# Patient Record
Sex: FEMALE | Race: WHITE | Employment: OTHER | URBAN - METROPOLITAN AREA
[De-identification: names, ages, dates, MRNs, and addresses within clinical notes are randomized per-mention and may not be internally consistent; named-entity substitution may affect disease eponyms.]

---

## 2018-11-05 ENCOUNTER — HOSPITAL ENCOUNTER (OUTPATIENT)
Dept: PHYSICAL THERAPY | Age: 78
Discharge: HOME | End: 2018-11-05
Payer: MEDICARE

## 2018-11-05 PROCEDURE — 97140 MANUAL THERAPY 1/> REGIONS: CPT

## 2018-11-05 PROCEDURE — 97110 THERAPEUTIC EXERCISES: CPT

## 2018-11-05 PROCEDURE — 97161 PT EVAL LOW COMPLEX 20 MIN: CPT

## 2018-11-05 PROCEDURE — G8985 CARRY GOAL STATUS: HCPCS

## 2018-11-05 PROCEDURE — G8984 CARRY CURRENT STATUS: HCPCS

## 2018-11-05 NOTE — PROGRESS NOTES
PT DAILY TREATMENT NOTE  Patient Name: Dorina Guevara Date:2018 : 1940 [x]  Patient  Verified Payor: [place] / Plan: [facility]ecia [facility] / Product Type: Medicare / In time:9:32  Out time:10:28 Total Treatment Time (min): 56 Total Timed Codes (min): 46 
1:1 Treatment Time (min): 46 Visit #: 1 of 8-10 Treatment Area: Acute pain of left wrist [M25.532] SUBJECTIVE Pain Level (0-10 scale): 2 Any medication changes, allergies to medications, adverse drug reactions, diagnosis change, or new procedure performed?: [x] No    [] Yes (see summary sheet for update) Subjective functional status/changes:   [] No changes reported SEE IE for Subjective Information OBJECTIVE Modality rationale: decrease edema, decrease inflammation and decrease pain to improve the patients ability to improve holding objects Type Additional Details  
[] Estim: []Att   []Unatt        []TENS instruct []IFC  []Premod   []NMES []Other:  []w/US   []w/ice   []w/heat Position: Location:  
[]  Traction: [] Cervical       []Lumbar 
                     [] Prone          []Supine []Intermittent   []Continuous Lbs: 
[] before manual 
[] after manual  
[]  Ultrasound: []Continuous   [] Pulsed []1MHz   []3MHz Location: 
W/cm2:  
[]  Iontophoresis with dexamethasone Location: [] Take home patch  
[] In clinic [x]  Ice     []  heat 
[]  Ice massage Position: sitting Location: L wrist   
[]  Vasopneumatic Device Pressure:       [] lo [] med [] hi  
Temperature: [] lo [] med [] hi  
[x] Skin assessment post-treatment:  [x]intact []redness- no adverse reaction 
     []redness  adverse reaction:  
 
 
15 min Therapeutic Exercise:  [x] See flow sheet :  
Rationale: increase ROM and increase strength to improve the patients ability to improve gripping, ADls 15 min Manual Therapy:  Retrograde massage; PROm L wrist, DMT to wrist extensors and flexors Rationale: decrease pain, increase ROM, increase tissue extensibility and decrease edema  to ipmrove   
 
       
x min Patient Education: [x] Review HEP    [] Progressed/Changed HEP based on:  
[] positioning   [] body mechanics   [] transfers   [] heat/ice application Other Objective/Functional Measures:  
See IE Wrist AROm: R ext 75, flexion 24, ulnar dev 40, radial dev 15 L extension 15, flexion 5, ulnar dev 15, radial 15 Supination  R 70, L 45 Pronation R 65, L 55 Edema: at styloid: L 18.5, R 17 Decreased edema to 17.5cm after MT  
 
Pain Level (0-10 scale) post treatment: 0 
 
ASSESSMENT/Changes in Function: see IE. Good respones to today's Rx Patient will continue to benefit from skilled PT services to modify and progress therapeutic interventions, address functional mobility deficits, address ROM deficits, address strength deficits, analyze and address soft tissue restrictions, analyze and cue movement patterns, analyze and modify body mechanics/ergonomics, assess and modify postural abnormalities and instruct in home and community integration to attain remaining goals. [x]  See Plan of Care 
[]  See progress note/recertification 
[]  See Discharge Summary Progress towards goals / Updated goals: SEE IE FOR GOALS PLAN 
[]  Upgrade activities as tolerated     []  Continue plan of care 
[]  Update interventions per flow sheet      
[]  Discharge due to:_ 
[x]  Other:Initiate POC as stated in the IE Justification for Eval Code Complexity: 
Patient History : none Examination see IE  
Clinical Presentation: stable Clinical Decision Making : FOTO 55/100 Catalina Guardado DPT 11/5/2018  8:08 AM

## 2018-11-05 NOTE — PROGRESS NOTES
[address] [facility] [address] [facility] AT [address] Phone: [phone] Fax: [phone] PLAN OF CARE / STATEMENT OF MEDICAL NECESSITY FOR PHYSICAL THERAPY SERVICES Patient Name: Leopold Passy : 1940 Medical  
Diagnosis: Acute pain of left wrist [M25.532] Treatment Diagnosis: L radial styloid nondisplaced rx Onset Date: 18 Referral Source: Orie Soulier, * Start of Care [facility]): 2018 Prior Hospitalization: See medical history Provider #: [id number] Prior Level of Function: WNL Comorbidities: L TSR, R bunionectomy, R tumor removal; L patellar fx, LATEX ALLERGY, arthritis Medications: Verified on Patient Summary List  
The Plan of Care and following information is based on the information from the initial evaluation.  
======================================================================== Assessment / key information:  Pt is a 69 yo female s/p [address] on L wrist on 18. Pt was casted for 1 month. Follow-up xrays and pt presents in a splint. Previous rx has included the following: light exercises froM MD. She presents with pain ranging from 2-8/10, located ulnar styloid and up the medial aspect of the ulna. Pt wearing brace for all activities except showering. Pain is made worse with lifting (pots), driving (large turns), better with tylenol. Visual Inspection reveals no bruising, edema on ventral wrist.  Palpation reveals TTP ulnar styloid. Wrist AROM: R ext 75, flexion 24, ulnar dev 40, radial dev 15 L extension 15, flexion 5, ulnar dev 15, radial 15 Supination  R 70, L 45; Pronation R 65, L 55 Edema: at styloid:  L 18.5, R 17 (edema decreased to 17.5 on the L after MT) Strength NT due to limited ROM and pain. Functional limitations include: lifting, return to gym, housework with the (L), opening jars. typing.   Pt will benefit from PT interventions to address the aforementioned deficits and allow pt to return to PLOF.  ======================================================================== Eval Complexity: History: LOW Complexity : Zero comorbidities / personal factors that will impact the outcome / POCExam:HIGH Complexity : 4+ Standardized tests and measures addressing body structure, function, activity limitation and / or participation in recreation  Presentation: LOW Complexity : Stable, uncomplicated  Clinical Decision Making:MEDIUM Complexity : FOTO score of 26-74Overall Complexity:LOW Problem List: pain affecting function, decrease ROM, decrease strength, edema affecting function, decrease ADL/ functional abilitiies, decrease activity tolerance, decrease flexibility/ joint mobility and decrease transfer abilities Treatment Plan may include any combination of the following: Therapeutic exercise, Therapeutic activities, Neuromuscular re-education, Physical agent/modality, Manual therapy, Patient education, Self Care training, Functional mobility training and Home safety training Patient / Family readiness to learn indicated by: asking questions and trying to perform skills Persons(s) to be included in education: patient (P) Barriers to Learning/Limitations: None Measures taken:   
Patient Goal (s):\"get back to normal, gain mobility \" Patient self reported health status: good Rehabilitation Potential: excellent ? Short Term Goals: To be accomplished in  2  treatments: 1. Pt will be independent and compliant with HEP to decrease pain, increase ROM and return pt to PLOF. ? Long Term Goals: To be accomplished in  8-10  treatments: 1. Pt will increase score on the FOTO to > or = 70/100 to demo an increase in functional activity tolerance. 2. Pt will have an increase in L wrist AROM to > or = 65 degrees extension to improve all gripping activities 3.  Pt will be able to self-manage ave pain to < or = 2/10 to restore ease of movement, self-care, work. 4. Pt will have an increase in L wrist supination to > or = 65 to restore eating driving, self-care Frequency / Duration:   Patient to be seen  2-3  times per week for 8-10  treatments: 
Patient / Caregiver education and instruction: self care, activity modification and exercises G-Codes (GP): Carry [id number] Current  CK= 40-59%  Goal  CJ= 20-39%. The severity rating is based on the FOTO Score Therapist Signature: Osiel Sultana DPT Date: 11/5/2018 Certification Period: 11/5/18 to 2/3/19 Time: 8:08 AM  
======================================================================== I certify that the above Physical Therapy Services are being furnished while the patient is under my care. I agree with the treatment plan and certify that this therapy is necessary. Physician Signature:       Date:      Time: 
Please sign and return to [facility] or you may fax the signed copy to [phone]. Thank you. \

## 2018-11-12 ENCOUNTER — HOSPITAL ENCOUNTER (OUTPATIENT)
Dept: PHYSICAL THERAPY | Age: 78
Discharge: HOME | End: 2018-11-12
Payer: MEDICARE

## 2018-11-12 PROCEDURE — 97110 THERAPEUTIC EXERCISES: CPT | Performed by: PHYSICAL THERAPIST

## 2018-11-12 PROCEDURE — 97140 MANUAL THERAPY 1/> REGIONS: CPT | Performed by: PHYSICAL THERAPIST

## 2018-11-12 NOTE — PROGRESS NOTES
PHYSICAL THERAPY - DAILY TREATMENT NOTE   Patient Name: Latasha Carmona        Date: 2018 : 1940   YES Patient  Verified Visit #:   2   of   8-10  Insurance: Payor: Avelino Pavon / Plan: Venecia aLinezy / Product Type: Medicare / In time: 7:05 Out time: 8:00 Total Treatment Time: 54 Medicare Time Tracking (below) Total Timed Codes (min):  45 1:1 Treatment Time:  45 TREATMENT AREA =  Left wrist 
SUBJECTIVE Pain Level (on 0 to 10 scale):  4  / 10 Medication Changes/New allergies or changes in medical history, any new surgeries or procedures? NO    If yes, update Summary List  
Subjective Functional Status/Changes:  []  No changes reported States no difficulty with HEP except the wrist/finger opening/closing exercise. States that makes her hand and wrist the most sore. OBJECTIVE Modalities Rationale:      
 min [] Estim, type/location:   
                                 []  att     []  unatt     []  w/US     []  w/ice    []  w/heat 
 min []  Mechanical Traction: type/lbs   
               []  pro   []  sup   []  int   []  cont    []  before manual    []  after manual  
 min []  Ultrasound, settings/location:    
 min []  Iontophoresis w/ dexamethasone, location:   
                                           []  take home patch       []  in clinic  
10 min [x]  Ice     []  Heat    location/position: Left wrist, sitting with arm resting on pillow  
 min []  Vasopneumatic Device, press/temp:   
 min []  Other:   
[x] Skin assessment post-treatment (if applicable):   
[]  intact    [x]  redness- no adverse reaction    
[]redness  adverse reaction:   
 
35 min Therapeutic Exercise:  [x]  See flow sheet Rationale:      increase ROM, increase strength, improve coordination and increase proprioception to improve the patients ability to  improve gripping, ADls 10 min Manual Therapy: PROm L wrist, DMT to wrist extensors and flexors Rationale:      increase ROM to improve patient's ability to improve ability to perform functional tasks. with TE min Patient Education:  Scott Castorena []  Progressed/Changed HEP based on: Other Objective/Functional Measures: 
 
First f/u after eval. Initiated exercises per flowsheet. Post Treatment Pain Level (on 0 to 10) scale:   3  / 10 ASSESSMENT Assessment/Changes in Function:  
 
Initiated exercises per flowsheet after initial review of HEP. Patient progressing as expected. Patient most limited in wrist flexion. []  See Progress Note/Recertification Patient will continue to benefit from skilled PT services to modify and progress therapeutic interventions, address functional mobility deficits, address ROM deficits, address strength deficits, analyze and address soft tissue restrictions, analyze and cue movement patterns and analyze and modify body mechanics/ergonomics to attain remaining goals. to attain remaining goals. Progress toward goals / Updated goals: · Short Term Goals: To be accomplished in  2  treatments: 1. Pt will be independent and compliant with HEP to decrease pain, increase ROM and return pt to PLOF. Met, pt reports compliance. 11/12/18 · Long Term Goals: To be accomplished in  8-10  treatments: 1. Pt will increase score on the FOTO to > or = 70/100 to demo an increase in functional activity tolerance. 2. Pt will have an increase in L wrist AROM to > or = 65 degrees extension to improve all gripping activities 3. Pt will be able to self-manage ave pain to < or = 2/10 to restore ease of movement, self-care, work. 4. Pt will have an increase in L wrist supination to > or = 65 to restore eating driving, self-care PLAN [x]  Upgrade activities as tolerated YES Continue plan of care  
[]  Discharge due to :   
[]  Other:   
 
Therapist: Shanna Ibrahim DPT Date: 11/12/2018 Time: 7:26 AM

## 2018-11-15 ENCOUNTER — HOSPITAL ENCOUNTER (OUTPATIENT)
Dept: PHYSICAL THERAPY | Age: 78
Discharge: HOME | End: 2018-11-15
Payer: MEDICARE

## 2018-11-15 PROCEDURE — 97110 THERAPEUTIC EXERCISES: CPT

## 2018-11-15 PROCEDURE — 97140 MANUAL THERAPY 1/> REGIONS: CPT

## 2018-11-15 NOTE — PROGRESS NOTES
PT DAILY TREATMENT NOTE  Patient Name: Jose Antonio Ornelas Date:11/15/2018 : 1940 [x]  Patient  Verified Payor: [place] MEDICARE / Plan: Venecia Calvillo / Product Type: Medicare / In time: 10:34 am       Out time: 11:20 am 
Total Treatment Time (min): 46 Total Timed Codes (min): 36 
1:1 Treatment Time (min): 30 Visit #: 2 of 8-10 Treatment Area: Left wrist pain [M25.532] SUBJECTIVE Pain Level (0-10 scale): 2-3 Any medication changes, allergies to medications, adverse drug reactions, diagnosis change, or new procedure performed?: [x] No    [] Yes (see summary sheet for update) Subjective functional status/changes:   [x] No changes reported \"I see the doctor tomorrow. Hopefully he will let me take my brace off. My wrist feels better since last time, but it is just so stiff. I was able to type the other day for about 30 minutes to an hour and felt some pain with that. \" OBJECTIVEModality rationale: decrease edema, decrease inflammation and decrease pain to improve the patients ability to > comfort with static positioning, reading Min Type Additional Details []  Ultrasound: []Continuous   [] Pulsed []1MHz   []3MHz Location: 
W/cm2:  
10 [x]  Ice     []  heat 
[]  Ice massage Position: reclined Location: (L) wrist  
[x] Skin assessment post-treatment:  [x]intact    []redness- no adverse reaction 
                                                                               []redness  adverse reaction:  
 
26 min Therapeutic Exercise:  [x] See flow sheet:   
Rationale: increase ROM, increase strength, improve coordination and increase proprioception to improve the patients ability to improve gripping, ADLs 10 min Manual Therapy:  (L) wrist retro massage f/b PROM wrist flex ext, radial deviation, ulnar deviation, grade III mobs to proximal radius Rationale: decrease pain and increase ROM to improve the patient's ability to pull, , perform ADLs 
       
with TE min Patient Education: [x] Review HEP Other Objective/Functional Measures: (L) forearm AROM: supination 89 deg (L) wrist AROM: flex 39 deg, ext 35 deg Pain Level (0-10 scale) post treatment: 3 
 
ASSESSMENT/Changes in Function:  
Patient progressing well with inc'ing mobility of the (L) forearm and wrist. Significant decrease in UE edema and patient notes she is now able to put on her wedding ring due to edema reduction. Patient will continue to benefit from skilled PT services to modify and progress therapeutic interventions, address functional mobility deficits, address ROM deficits, address strength deficits, analyze and address soft tissue restrictions, analyze and cue movement patterns, analyze and modify body mechanics/ergonomics and assess and modify postural abnormalities to attain remaining goals. [x]  See Plan of Care 
[]  See progress note/recertification 
[]  See Discharge Summary Progress towards goals / Updated goals: 
Short Term Goals: To be accomplished in  2  treatments: 1. Pt will be independent and compliant with HEP to decrease pain, increase ROM and return pt to PLOF. -Goal met; patient notes compliance (11/15/18) Long Term Goals: To be accomplished in  8-10  treatments: 1. Pt will increase score on the FOTO to > or = 70/100 to demo an increase in functional activity tolerance. 2. Pt will have an increase in L wrist AROM to > or = 65 degrees extension to improve all gripping activities. -Goal slowly progressing; inc to 35 deg wrist AROM with the elbow flexed (11/15/18) 3. Pt will be able to self-manage ave pain to < or = 2/10 to restore ease of movement, self-care, work. 4. Pt will have an increase in L wrist supination to > or = 65 to restore eating driving, self-care. -Goal met; pt demos (L) wrist supination 89 deg after manual (11/15/18) PLAN 
 [x]  Upgrade activities as tolerated     [x]  Continue plan of care 
[]  Update interventions per flow sheet      
[]  Discharge due to:_ 
[x]  Other: pt to f/u with referring MD tomorrow; expect D/C sling and discussed activity modification (avoiding heavy lifting, weights, pushing if D/C from sling via MD); [place] week for the Holiday and expect return in two weeks Billy Infante, PTA 11/15/2018

## 2018-11-28 ENCOUNTER — HOSPITAL ENCOUNTER (OUTPATIENT)
Dept: PHYSICAL THERAPY | Age: 78
Discharge: HOME | End: 2018-11-28
Payer: MEDICARE

## 2018-11-28 PROCEDURE — 97140 MANUAL THERAPY 1/> REGIONS: CPT

## 2018-11-28 PROCEDURE — 97110 THERAPEUTIC EXERCISES: CPT

## 2018-11-28 NOTE — PROGRESS NOTES
PT DAILY TREATMENT NOTE  Patient Name: Lacey Rod Date:2018 : 1940 [x]  Patient  Verified Payor: [place] MEDICARE / Plan: Venecia Elizabeth y / Product Type: Medicare / In time: 704   Out time: 567 Total Treatment Time (min): 53 Total Timed Codes (min): 43 
1:1 Treatment Time (min): 43 Visit #: 3 of 8-10 Treatment Area: Left wrist pain [M25.532] SUBJECTIVE Pain Level (0-10 scale):2-3/10 Any medication changes, allergies to medications, adverse drug reactions, diagnosis change, or new procedure performed?: [x] No    [] Yes (see summary sheet for update) Subjective functional status/changes:   [x] No changes reported \"It hurts\" OBJECTIVEModality rationale: decrease edema, decrease inflammation and decrease pain to improve the patients ability to > comfort with static positioning, reading Min Type Additional Details []  Ultrasound: []Continuous   [] Pulsed []1MHz   []3MHz Location: 
W/cm2:  
10 [x]  Ice     []  heat 
[]  Ice massage Position: reclined Location: (L) wrist  
[x] Skin assessment post-treatment:  [x]intact    []redness- no adverse reaction 
                                                                               []redness  adverse reaction:  
 
28 min Therapeutic Exercise:  [x] See flow sheet:   
Rationale: increase ROM, increase strength, improve coordination and increase proprioception to improve the patients ability to improve gripping, ADLs 15 min Manual Therapy:  (L) wrist retro massage f/b PROM wrist flex ext, radial deviation, ulnar deviation, grade III mobs to proximal radius Rationale: decrease pain and increase ROM to improve the patient's ability to pull, , perform ADLs 
       
with TE min Patient Education: [x] Review HEP Other Objective/Functional Measures:  
 Pain at best 0, pain at worst 9/10 - short duration shooting pain Full elbow AROM flexion and extension Pain Level (0-10 scale) post treatment: 3 
 
ASSESSMENT/Changes in Function:  
Patient presents with most difficulty with supination and radial deviation. . CO finger stiffness in am likely sec to edema and and non-movement overnight. Patient challenged by hand strengthening exercises. VCing to relax through shoulders during prayer stretch. Patient will continue to benefit from skilled PT services to modify and progress therapeutic interventions, address functional mobility deficits, address ROM deficits, address strength deficits, analyze and address soft tissue restrictions, analyze and cue movement patterns, analyze and modify body mechanics/ergonomics and assess and modify postural abnormalities to attain remaining goals. [x]  See Plan of Care 
[]  See progress note/recertification 
[]  See Discharge Summary Progress towards goals / Updated goals: 
Short Term Goals: To be accomplished in  2  treatments: 1. Pt will be independent and compliant with HEP to decrease pain, increase ROM and return pt to PLOF. -Goal met; patient notes compliance (11/15/18), cont 11/28/18 Long Term Goals: To be accomplished in  8-10  treatments: 1. Pt will increase score on the FOTO to > or = 70/100 to demo an increase in functional activity tolerance. 2. Pt will have an increase in L wrist AROM to > or = 65 degrees extension to improve all gripping activities. -Goal slowly progressing; inc to 35 deg wrist AROM with the elbow flexed (11/15/18) 3. Pt will be able to self-manage ave pain to < or = 2/10 to restore ease of movement, self-care, work. Goal progressing at 2/10 at best and 9/10 at worst 11/28/18 4. Pt will have an increase in L wrist supination to > or = 65 to restore eating driving, self-care. -Goal met; pt demos (L) wrist supination 89 deg after manual (11/15/18) PLAN [x]  Upgrade activities as tolerated     [x]  Continue plan of care 
[]  Update interventions per flow sheet []  Discharge due to:_ 
[x]  Other: taping for edema? PN due next week - 12/5 Awa Coleman, PT 11/28/2018

## 2018-11-30 ENCOUNTER — HOSPITAL ENCOUNTER (OUTPATIENT)
Dept: PHYSICAL THERAPY | Age: 78
Discharge: HOME | End: 2018-11-30
Payer: MEDICARE

## 2018-11-30 PROCEDURE — 97110 THERAPEUTIC EXERCISES: CPT

## 2018-11-30 PROCEDURE — 97140 MANUAL THERAPY 1/> REGIONS: CPT

## 2018-11-30 NOTE — PROGRESS NOTES
PT DAILY TREATMENT NOTE  Patient Name: Jb Umana Date:2018 : 1940 [x]  Patient  Verified Payor: [place] MEDICARE / Plan: Venecia Calvillo / Product Type: Medicare / In time: 7:00 am      Out time: 8:03 am 
Total Treatment Time (min): 63 Total Timed Codes (min): 53 
1:1 Treatment Time (min): 48 Visit #: 5 of 8-10 Treatment Area: Left wrist pain [M25.532] SUBJECTIVE Pain Level (0-10 scale): 5 Any medication changes, allergies to medications, adverse drug reactions, diagnosis change, or new procedure performed?: [x] No    [] Yes (see summary sheet for update) Subjective functional status/changes:   [] No changes reported \"I overworked it yesterday helping to prepare for the [employer] this weekend. The mobility is better, but the pain is there. \" OBJECTIVEModality rationale: decrease edema, decrease inflammation and decrease pain to improve the patients ability to > comfort with static positioning, reading Min Type Additional Details []  Ultrasound: []Continuous   [] Pulsed []1MHz   []3MHz Location: 
W/cm2:  
10 [x]  Ice     []  heat 
[]  Ice massage Position: reclined Location: (L) wrist  
[x] Skin assessment post-treatment:  [x]intact    []redness- no adverse reaction 
                                                                               []redness  adverse reaction:  
 
29 min Therapeutic Exercise:  [x] See flow sheet: progressed gripper Rationale: increase ROM, increase strength, improve coordination and increase proprioception to improve the patients ability to improve gripping, ADLs 24 min Manual Therapy:  (L) wrist retro massage f/b PROM wrist flex ext, radial deviation, ulnar deviation, grade III mobs to proximal radius, distraction, k-taping for edema, TPR to flexor wad < pronator teres Rationale: decrease pain and increase ROM to improve the patient's ability to pull, , perform ADLs with TE min Patient Education: [x] Review HEP Other Objective/Functional Measures: (L) wrist/forearm AROM: flex 39 deg, ext 60 deg, supination 70 deg, pronation 60 deg Pain Level (0-10 scale) post treatment: 2-3 
 
ASSESSMENT/Changes in Function:  
Patient presenting with improving wrist mobility in all directions. (+) TTP to the pronator teres improved with manual interventions. Patient will continue to benefit from skilled PT services to modify and progress therapeutic interventions, address functional mobility deficits, address ROM deficits, address strength deficits, analyze and address soft tissue restrictions, analyze and cue movement patterns, analyze and modify body mechanics/ergonomics and assess and modify postural abnormalities to attain remaining goals. [x]  See Plan of Care 
[]  See progress note/recertification 
[]  See Discharge Summary Progress towards goals / Updated goals: 
Short Term Goals: To be accomplished in  2  treatments: 1. Pt will be independent and compliant with HEP to decrease pain, increase ROM and return pt to PLOF. -Goal met; patient notes compliance (11/15/18), cont 11/28/18 Long Term Goals: To be accomplished in  8-10  treatments: 1. Pt will increase score on the FOTO to > or = 70/100 to demo an increase in functional activity tolerance. 2. Pt will have an increase in L wrist AROM to > or = 65 degrees extension to improve all gripping activities. -Goal progressing; 60 deg wrist EXT with the elbow flexed to 90 deg (11/30/18) 3. Pt will be able to self-manage ave pain to < or = 2/10 to restore ease of movement, self-care, work. Goal progressing at 2/10 at best and 9/10 at worst 11/28/18 4. Pt will have an increase in L wrist supination to > or = 65 to restore eating driving, self-care. -Goal met; pt demos (L) wrist supination 70 deg pre-MT (11/30/18) PLAN [x]  Upgrade activities as tolerated     [x]  Continue plan of care []  Update interventions per flow sheet      
[]  Discharge due to:_ 
[x]  Other: assess response to taping; PN due next week - 12/5 Jennifer Jimenez, PTA 11/30/2018

## 2018-12-03 ENCOUNTER — HOSPITAL ENCOUNTER (OUTPATIENT)
Dept: PHYSICAL THERAPY | Age: 78
Discharge: HOME | End: 2018-12-03
Payer: MEDICARE

## 2018-12-03 PROCEDURE — 97110 THERAPEUTIC EXERCISES: CPT

## 2018-12-03 PROCEDURE — 97140 MANUAL THERAPY 1/> REGIONS: CPT

## 2018-12-03 NOTE — PROGRESS NOTES
PT DAILY TREATMENT NOTE  Patient Name: Lupe Macedo Date:12/3/2018 : 1940 [x]  Patient  Verified Payor: [place] / Plan: Venecia Elizabeth y / Product Type: Medicare / In time:701   Out time: 928 Total Treatment Time (min): 50 Total Timed Codes (min): 40 
1:1 Treatment Time (min): 40 Visit #: 6 of 8-10 Treatment Area: Left wrist pain [M25.532] SUBJECTIVE Pain Level (0-10 scale): 3 Any medication changes, allergies to medications, adverse drug reactions, diagnosis change, or new procedure performed?: [x] No    [] Yes (see summary sheet for update) Subjective functional status/changes:   [] No changes reported \"The taping was a bust \" OBJECTIVEModality rationale: decrease edema, decrease inflammation and decrease pain to improve the patients ability to > comfort with static positioning, reading Min Type Additional Details []  Ultrasound: []Continuous   [] Pulsed []1MHz   []3MHz Location: 
W/cm2:  
10 [x]  Ice     []  heat 
[]  Ice massage Position: reclined Location: (L) wrist  
[x] Skin assessment post-treatment:  [x]intact    []redness- no adverse reaction 
                                                                               []redness  adverse reaction:  
 
24 min Therapeutic Exercise:  [x] See flow sheet:   
Rationale: increase ROM, increase strength, improve coordination and increase proprioception to improve the patients ability to improve gripping, ADLs 16 min Manual Therapy:  (L) wrist retro massage f/b PROM wrist flex ext, radial deviation, ulnar deviation, grade III mobs to proximal radius, distraction Rationale: decrease pain and increase ROM to improve the patient's ability to pull, , perform ADLs 
       
with TE min Patient Education: [x] Review HEP Other Objective/Functional Measures:  
  Initiated 3 way bicep curl to prevent proximal atrophy during recovery Pain Level (0-10 scale) post treatment: 3 
 
ASSESSMENT/Changes in Function:  
Patient reports taping did not improve swelling and that it rolled up. Patient educated on upcoming reassessment - patient feels PT has been beneficial.   .Patient tolerated new therex well with \"feel good\" sensation with neutral curl and mild discomrt with prontated Patient will continue to benefit from skilled PT services to modify and progress therapeutic interventions, address functional mobility deficits, address ROM deficits, address strength deficits, analyze and address soft tissue restrictions, analyze and cue movement patterns, analyze and modify body mechanics/ergonomics and assess and modify postural abnormalities to attain remaining goals. [x]  See Plan of Care 
[]  See progress note/recertification 
[]  See Discharge Summary Progress towards goals / Updated goals: ALL GOALS TO BE FORMALLY REASSESSED FOR PN NV 12/3/18 Short Term Goals: To be accomplished in  2  treatments: 1. Pt will be independent and compliant with HEP to decrease pain, increase ROM and return pt to PLOF. -Goal met; patient notes compliance (11/15/18), cont 11/28/18 Long Term Goals: To be accomplished in  8-10  treatments: 1. Pt will increase score on the FOTO to > or = 70/100 to demo an increase in functional activity tolerance. 2. Pt will have an increase in L wrist AROM to > or = 65 degrees extension to improve all gripping activities. -Goal progressing; 60 deg wrist EXT with the elbow flexed to 90 deg (11/30/18) 3. Pt will be able to self-manage ave pain to < or = 2/10 to restore ease of movement, self-care, work. Goal progressing at 2/10 at best and 9/10 at worst 11/28/18 4. Pt will have an increase in L wrist supination to > or = 65 to restore eating driving, self-care. -Goal met; pt demos (L) wrist supination 70 deg pre-MT (11/30/18) PLAN [x]  Upgrade activities as tolerated     [x]  Continue plan of care []  Update interventions per flow sheet      
[]  Discharge due to:_ 
[x]  Other: PN due NV for continuation Pankaj Diggs, PT 12/3/2018

## 2018-12-05 ENCOUNTER — HOSPITAL ENCOUNTER (OUTPATIENT)
Dept: PHYSICAL THERAPY | Age: 78
Discharge: HOME | End: 2018-12-05
Payer: MEDICARE

## 2018-12-05 PROCEDURE — 97110 THERAPEUTIC EXERCISES: CPT

## 2018-12-05 PROCEDURE — 97140 MANUAL THERAPY 1/> REGIONS: CPT

## 2018-12-05 PROCEDURE — G8985 CARRY GOAL STATUS: HCPCS

## 2018-12-05 PROCEDURE — G8984 CARRY CURRENT STATUS: HCPCS

## 2018-12-05 NOTE — PROGRESS NOTES
[address] [facility] [address] [facility] AT [address] [facility], [place] [address] Phone: [phone] Fax: [phone] PROGRESS NOTE Patient Name: Dilia Scott : 1940 Treatment/Medical Diagnosis: Left wrist pain [M25.532] Referral Source: Poppy Ayala, * Date of Initial Visit: 18 Attended Visits: 7 Missed Visits: 0  
SUMMARY OF TREATMENT Patient is being treated for L wrist radial styloid fracture after fall at the gym. Patient has been seen for 7 visits after short vacation at beginning of treatment. Treatment has included progressive therex for ROM , flexibility and elbow strengthening, manual mobilizations and ice for edema CURRENT STATUS Patient is making good steady progress with PT. Mobility is limited by joint stiffness and intermittent pain, but patient reports overall improvements. Other assessment as follows: 
Pain at best 0, at worst 8/10 Subjective % improvement 50% Objective:  
 Wrist AROM : flexion 49, extension 56, pronation 90, supination 90 Wrist strength - grossly 4-/5 with pain throughout Elbow AROM WNL: Strength flexion 4+, extension 4+ Edema at styloid : L 16.5, R 15.5 Finger extension - painful PROM Improvements: buttons/ finger dexterity, LE dressing, ADLS , light gripping Deficits : most pain - lateral wrist and hand , gripping of heavier objects, anything requiring wrist rotation - especially loaded - ie in the kitchen carry pots Progress towards goals / Updated goals: 
Short Term Goals: To be accomplished in  2  treatments: 1. Pt will be independent and compliant with HEP to decrease pain, increase ROM and return pt to PLOF. -Goal met; patient notes compliance with progressive program  (HEP est at IE) Long Term Goals: To be accomplished in  8-10  treatments: 1.  Pt will increase score on the FOTO to > or = 70/100 to demo an increase in functional activity tolerance. Goal to be assessed at next PN as overall mobility is slow progressing (55/100 at IE) 2. Pt will have an increase in L wrist AROM to > or = 65 degrees extension to improve all gripping activities. -Goal progressing; at 56 deg (15 at IE) 3. Pt will be able to self-manage ave pain to < or = 2/10 to restore ease of movement, self-care, work. Goal progressing 2-8/10  (2-8 at IE) 4. Pt will have an increase in L wrist supination to > or = 65 to restore eating driving, self-care. -Goal met; pt demos (L) wrist supination 90 deg (45 at IE) New Goals to be achieved in __8-10__  treatments: 
Cont LTG 1, 2, 3 
4. Patient will demo increased wrist extension strength to 4+/5 for improved  G-Codes: Carry:  Current  CK= 40-59%  Goal  CJ= 20-39%. The severity rating is based on the FOTO Score RECOMMENDATIONS Patient would benefit from continuation of skilled PT to address above mentioned deficits and progress to PLOF. Cont 2-3 x 8-10 sessions as needed. If you have any questions/comments please contact us directly at [phone] Thank you for allowing us to assist in the care of your patient. Therapist Signature: Last [place] PT Date: 12/5/2018 Time: 739am   
NOTE TO PHYSICIAN:  PLEASE COMPLETE THE ORDERS BELOW AND FAX TO [facility]: [phone]. If you are unable to process this request in 24 hours please contact our office: [phone]. 
___ I have read the above report and request that my patient continue as recommended.  
___ I have read the above report and request that my patient continue therapy with the following changes/special instructions:_________________________________________________________  
___ I have read the above report and request that my patient be discharged from therapy.   
 
Physician Signature:       Date:      Time:

## 2018-12-05 NOTE — PROGRESS NOTES
PT DAILY TREATMENT NOTE  Patient Name: Carey Das Date:2018 : 1940 [x]  Patient  Verified Payor: [place] MEDICARE / Plan: Venecia Elizabeth y / Product Type: Medicare / In time:700 Out time: 480 75 Total Treatment Time (min): 52 Total Timed Codes (min): 42 
1:1 Treatment Time (min): 700 -738 (38) Visit #: 7 of 8-10 Treatment Area: Left wrist pain [M25.532] SUBJECTIVE Pain Level (0-10 scale): 2 Any medication changes, allergies to medications, adverse drug reactions, diagnosis change, or new procedure performed?: [x] No    [] Yes (see summary sheet for update) Subjective functional status/changes:   [] No changes reported \"Theres always a dull ache, but theres no real pain. \" OBJECTIVEModality rationale: decrease edema, decrease inflammation and decrease pain to improve the patients ability to > comfort with static positioning, reading Min Type Additional Details []  Ultrasound: []Continuous   [] Pulsed []1MHz   []3MHz Location: 
W/cm2:  
10 [x]  Ice     []  heat 
[]  Ice massage Position: reclined Location: (L) wrist  
[x] Skin assessment post-treatment:  [x]intact    []redness- no adverse reaction 
                                                                               []redness  adverse reaction:  
 
23 min Therapeutic Exercise:  [x] See flow sheet: REASSESS Rationale: increase ROM, increase strength, improve coordination and increase proprioception to improve the patients ability to improve gripping, ADLs 19 min Manual Therapy:  (L) wrist retro massage f/b PROM wrist flex ext, radial deviation, ulnar deviation, grade III mobs to proximal radius, distraction, retrograde massage,  reassess ROM Rationale: decrease pain and increase ROM to improve the patient's ability to pull, , perform ADLs 
       
with TE min Patient Education: [x] Review HEP Other Objective/Functional Measures:  
  Goals assessed for pn  
 Pain at best 0, at worst 8/10 Subjective % improvement 50% Objective:  
 Wrist AROM : flexion 49, extension 56, pronation 90, supination 90 Wrist strength - grossly 4-/5 with pain throughout Elbow AROM WNL: Strength flexion 4+, extension 4+ Edema at styloid : L 16.5, R 15.5 Finger extension - painful PROM Improvements: buttons/ finger dexterity, LE dressing, ADLS , light gripping Deficits : most pain - lateral wrist and hand , gripping of heavier objects, anything requiring wrist rotation - especially loaded - ie in the kitchen carry pots Pain Level (0-10 scale) post treatment: 2 
 
ASSESSMENT/Changes in Function: SEE PN Patient will continue to benefit from skilled PT services to modify and progress therapeutic interventions, address functional mobility deficits, address ROM deficits, address strength deficits, analyze and address soft tissue restrictions, analyze and cue movement patterns, analyze and modify body mechanics/ergonomics and assess and modify postural abnormalities to attain remaining goals. []  See Plan of Care [x]  See progress note/recertification 81/6/14 []  See Discharge Summary Progress towards goals / Updated goals: 
Short Term Goals: To be accomplished in  2  treatments: 1. Pt will be independent and compliant with HEP to decrease pain, increase ROM and return pt to PLOF. -Goal met; patient notes compliance with progressive program  (HEP est at IE) Long Term Goals: To be accomplished in  8-10  treatments: 1. Pt will increase score on the FOTO to > or = 70/100 to demo an increase in functional activity tolerance. Goal to be assessed at next PN as overall mobility is slow progressing (55/100 at IE) 2. Pt will have an increase in L wrist AROM to > or = 65 degrees extension to improve all gripping activities. -Goal progressing; at 56 deg (15 at IE) 3.  Pt will be able to self-manage ave pain to < or = 2/10 to restore ease of movement, self-care, work. Goal progressing 2-8/10  (2-8 at IE) 4. Pt will have an increase in L wrist supination to > or = 65 to restore eating driving, self-care. -Goal met; pt demos (L) wrist supination 90 deg (45 at IE) PLAN [x]  Upgrade activities as tolerated     [x]  Continue plan of care 
[]  Update interventions per flow sheet      
[]  Discharge due to:_ 
[x]  Other: Cont per PN 3x8-10 Bethany Baltazar, PT 12/5/2018

## 2018-12-10 ENCOUNTER — HOSPITAL ENCOUNTER (OUTPATIENT)
Dept: PHYSICAL THERAPY | Age: 78
Discharge: HOME | End: 2018-12-10
Payer: MEDICARE

## 2018-12-10 PROCEDURE — 97110 THERAPEUTIC EXERCISES: CPT

## 2018-12-10 PROCEDURE — 97140 MANUAL THERAPY 1/> REGIONS: CPT

## 2018-12-10 NOTE — PROGRESS NOTES
PT DAILY TREATMENT NOTE  Patient Name: Kelley Euceda Date:12/10/2018 : 1940 [x]  Patient  Verified Payor: [place] MEDICARE / Plan: Venecia Elizabeth y / Product Type: Medicare / In time:702 Out time: 467 Total Treatment Time (min): 52 Total Timed Codes (min): 42 
1:1 Treatment Time (min): 702 - 740 (38) Visit #: 1 of 8-10 Treatment Area: Left wrist pain [M25.532] SUBJECTIVE Pain Level (0-10 scale): 2-3 Any medication changes, allergies to medications, adverse drug reactions, diagnosis change, or new procedure performed?: [x] No    [] Yes (see summary sheet for update) Subjective functional status/changes:   [] No changes reported \"I always have some discomfort. \" OBJECTIVEModality rationale: decrease edema, decrease inflammation and decrease pain to improve the patients ability to > comfort with static positioning, reading Min Type Additional Details []  Ultrasound: []Continuous   [] Pulsed []1MHz   []3MHz Location: 
W/cm2:  
10 [x]  Ice     []  heat 
[]  Ice massage Position: reclined Location: (L) wrist  
[x] Skin assessment post-treatment:  [x]intact    []redness- no adverse reaction 
                                                                               []redness  adverse reaction:  
 
23 min Therapeutic Exercise:  [x] See flow sheet:    
Rationale: increase ROM, increase strength, improve coordination and increase proprioception to improve the patients ability to improve gripping, ADLs 19 min Manual Therapy:  (L) wrist retro massage f/b PROM wrist flex ext, radial deviation, ulnar deviation, grade III mobs to proximal radius, distraction, phalangeal mobs - massiel 5th, carpal mobs for wrist extension Rationale: decrease pain and increase ROM to improve the patient's ability to pull, , perform ADLs 
       
with TE min Patient Education: [x] Review HEP Other Objective/Functional Measures: Added AROM x 3 for shoulder to progress functional UE strength Pain Level (0-10 scale) post treatment: 2/10 ASSESSMENT/Changes in Function:  
Patient tolerated treatment progression well - challenged by UE PRE. Patient reports improved strength of 5th digit with gripper therex. Improved carpal mobility but still decreased overall. Patient will continue to benefit from skilled PT services to modify and progress therapeutic interventions, address functional mobility deficits, address ROM deficits, address strength deficits, analyze and address soft tissue restrictions, analyze and cue movement patterns, analyze and modify body mechanics/ergonomics and assess and modify postural abnormalities to attain remaining goals. []  See Plan of Care [x]  See progress note/recertification 48/2/41 []  See Discharge Summary Progress towards goals / Updated goals: PN COMPLETE LAST SESSION - CONT PER UPDATED GOALS 12/10/18 Long Term Goals: To be accomplished in  8-10  treatments: 1. Pt will increase score on the FOTO to > or = 70/100 to demo an increase in functional activity tolerance. Goal to be assessed at next PN as overall mobility is slow progressing (55/100 at IE) 2. Pt will have an increase in L wrist AROM to > or = 65 degrees extension to improve all gripping activities. -Goal progressing; at 56 deg (15 at IE) 3. Pt will be able to self-manage ave pain to < or = 2/10 to restore ease of movement, self-care, work. Goal progressing 2-8/10  (2-8 at IE) 4. Patient will demo increased wrist extension strength to 4+/5 for improved  PLAN [x]  Upgrade activities as tolerated     [x]  Continue plan of care 
[]  Update interventions per flow sheet      
[]  Discharge due to:_ 
[x]  Other: assess responese to PRE progression Angel Mcmanus, PT 12/10/2018

## 2018-12-13 ENCOUNTER — HOSPITAL ENCOUNTER (OUTPATIENT)
Dept: PHYSICAL THERAPY | Age: 78
Discharge: HOME | End: 2018-12-13
Payer: MEDICARE

## 2018-12-13 PROCEDURE — 97110 THERAPEUTIC EXERCISES: CPT | Performed by: PHYSICAL THERAPIST

## 2018-12-13 PROCEDURE — 97124 MASSAGE THERAPY: CPT | Performed by: PHYSICAL THERAPIST

## 2018-12-13 PROCEDURE — 97140 MANUAL THERAPY 1/> REGIONS: CPT

## 2018-12-13 NOTE — PROGRESS NOTES
PHYSICAL THERAPY - DAILY TREATMENT NOTE      Patient Name: Peri De La Torre        Date: 2018  : 1940   YES Patient  Verified  Visit #:   2   of   8-10  Insurance: Payor: Mansi Crane / Plan: [place] MEDICARE PART A & B / Product Type: Medicare /      In time: 9:00 Out time: 10:05   Total Treatment Time: 65     Medicare/BCBS Time Tracking (below)   Total Timed Codes (min):  55 1:1 Treatment Time:  55     TREATMENT AREA =  Left wrist pain [M25.532]    SUBJECTIVE    Pain Level (on 0 to 10 scale):  3  / 10   Medication Changes/New allergies or changes in medical history, any new surgeries or procedures? NO    If yes, update Summary List   Subjective Functional Status/Changes:  []  No changes reported     States that she can do whatever she needs to do at home except go to the gym but it still hurts. OBJECTIVE    Modalities Rationale:   decrease edema, decrease inflammation and decrease pain to improve the patients ability to improve ability to perform ADLs and functional tasks.       min [] Estim, type/location:                                      []  att     []  unatt     []  w/US     []  w/ice    []  w/heat    min []  Mechanical Traction: type/lbs                   []  pro   []  sup   []  int   []  cont    []  before manual    []  after manual    min []  Ultrasound, settings/location:      min []  Iontophoresis w/ dexamethasone, location:                                               []  take home patch       []  in clinic   10 min [x]  Ice     []  Heat    location/position: Left wrist in sitting.     min []  Vasopneumatic Device, press/temp:     min []  Other:    [] Skin assessment post-treatment (if applicable):    []  intact    []  redness- no adverse reaction     []redness  adverse reaction:      35 min Therapeutic Exercise:  [x]  See flow sheet   Rationale:      increase ROM, increase strength, improve coordination and increase proprioception to improve the patients ability to perform ADLs.     20 min Manual Therapy: Mobilization with movement to improve supination, wrist flexion, and wrist extension 3 x 10 each with pt OP; STM to writs flexors and extensors   Rationale:      decrease pain, increase ROM and increase tissue extensibility to improve patient's ability to perform ADLs      with TE min Patient Education:  YES  Reviewed HEP   []  Progressed/Changed HEP based on: Other Objective/Functional Measures:    Progressed wrist PREs per flowsheet. Active supination preMT: 40%, post MT 75%  No change in active ROM flex or ext after MT but pain at distal ulna abolished  Painful wrist flexion with 1# preMT, painfree wrist flexion with 1# post MT. Post Treatment Pain Level (on 0 to 10) scale:   0.5-1  / 10     ASSESSMENT    Assessment/Changes in Function:     Patient responded well to mobilization with movement with increased painfree ROM in the planes described above. Able to progress with wrist strengthening with minimal aggravation after MT.      []  See Progress Note/Recertification   Patient will continue to benefit from skilled PT services to modify and progress therapeutic interventions, address functional mobility deficits, address ROM deficits, address strength deficits, analyze and address soft tissue restrictions and analyze and cue movement patterns to attain remaining goals. to attain remaining goals. Progress toward goals / Updated goals:  Long Term Goals: To be accomplished in  8-10  treatments:  1. Pt will increase score on the FOTO to > or = 70/100 to demo an increase in functional activity tolerance. Goal to be assessed at next PN as overall mobility is slow progressing (55/100 at IE)  2. Pt will have an increase in L wrist AROM to > or = 65 degrees extension to improve all gripping activities.     -Goal progressing; at 56 deg (15 at IE)  3. Pt will be able to self-manage ave pain to < or = 2/10 to restore ease of movement, self-care, work.  Goal progressing 2-8/10  (2-8 at IE)  4.  Patient will demo increased wrist extension strength to 4+/5 for improved           PLAN    [x]  Upgrade activities as tolerated YES Continue plan of care   []  Discharge due to :    []  Other:      Therapist: Yogesh Madsen DPT    Date: 12/13/2018 Time: 9:09 AM

## 2018-12-14 ENCOUNTER — HOSPITAL ENCOUNTER (OUTPATIENT)
Dept: PHYSICAL THERAPY | Age: 78
Discharge: HOME | End: 2018-12-14
Payer: MEDICARE

## 2018-12-14 PROCEDURE — 97140 MANUAL THERAPY 1/> REGIONS: CPT

## 2018-12-14 PROCEDURE — 97110 THERAPEUTIC EXERCISES: CPT

## 2018-12-14 NOTE — PROGRESS NOTES
PT DAILY TREATMENT NOTE     Patient Name: Natalie Hair  Date:2018  : 1940  [x]  Patient  Verified  Payor: Stephanie Delay / Plan: [place] MEDICARE PART A & B / Product Type: Medicare /    In time: 7:31 pm           Out time: 8:30 pm  Total Treatment Time (min): 59  Total Timed Codes (min): 49  1:1 Treatment Time (min): 7:44am-8:16am (32)  Visit #: 3 of 8-10    Treatment Area: Left wrist pain [M25.532]    SUBJECTIVE  Pain Level (0-10 scale): 0.5  Any medication changes, allergies to medications, adverse drug reactions, diagnosis change, or new procedure performed?: [x] No    [] Yes (see summary sheet for update)  Subjective functional status/changes:   [] No changes reported  \"Feeling better after all the mashing on that muscle. \"    OBJECTIVE  Modality rationale: decrease edema, decrease inflammation and decrease pain to improve the patients ability to > comfort with static positioning, reading     Min Type Additional Details    []  Ultrasound: []Continuous   [] Pulsed                           []1MHz   []3MHz Location:  W/cm2:   10 [x]  Ice     []  heat  []  Ice massage Position: reclined   Location: (L) wrist   [x] Skin assessment post-treatment:  [x]intact    []redness- no adverse reaction                                                                                 []redness  adverse reaction:     35 min Therapeutic Exercise:  [x] See flow sheet:     Rationale: increase ROM, increase strength, improve coordination and increase proprioception to improve the patients ability to improve gripping, ADLs    14 min Manual Therapy:  (L) wrist retro massage f/b PROM wrist flex ext, radial deviation, ulnar deviation, grade III mobs to proximal radius, distraction, phalangeal mobs - massiel 5th, carpal mobs for wrist extension    Rationale: decrease pain and increase ROM to improve the patient's ability to pull, , perform ADLs          with TE min Patient Education: [x] Review HEP     Other Objective/Functional Measures:     (+) flexor carpi ulnaris tightness    Pain Level (0-10 scale) post treatment: 0    ASSESSMENT/Changes in Function:   Patient demos full forearm AROM. Cont's with flexor carpi ulnaris tenderness. Improved comfort with bicep PREs, pt feels unchallenged. Patient will continue to benefit from skilled PT services to modify and progress therapeutic interventions, address functional mobility deficits, address ROM deficits, address strength deficits, analyze and address soft tissue restrictions, analyze and cue movement patterns, analyze and modify body mechanics/ergonomics and assess and modify postural abnormalities to attain remaining goals. []  See Plan of Care  [x]  See progress note/recertification 93/9/70  []  See Discharge Summary         Progress towards goals / Updated goals:  Long Term Goals: To be accomplished in  8-10  treatments:  1. Pt will increase score on the FOTO to > or = 70/100 to demo an increase in functional activity tolerance. Goal to be assessed at next PN as overall mobility is slow progressing (55/100 at IE)  2. Pt will have an increase in L wrist AROM to > or = 65 degrees extension to improve all gripping activities. -Goal progressing; at 56 deg (15 at IE)  3. Pt will be able to self-manage ave pain to < or = 2/10 to restore ease of movement, self-care, work. Goal progressing; 2-8/10  (2-8 at IE)  4.   Patient will demo increased wrist extension strength to 4+/5 for improved       PLAN  [x]  Upgrade activities as tolerated     [x]  Continue plan of care  []  Update interventions per flow sheet       []  Discharge due to:_  [x]  Other: progress elbow flexion AROM JETHRO Guerrero, PTA 12/14/2018

## 2018-12-17 ENCOUNTER — HOSPITAL ENCOUNTER (OUTPATIENT)
Dept: PHYSICAL THERAPY | Age: 78
Discharge: HOME | End: 2018-12-17
Payer: MEDICARE

## 2018-12-17 PROCEDURE — 97140 MANUAL THERAPY 1/> REGIONS: CPT

## 2018-12-17 PROCEDURE — 97110 THERAPEUTIC EXERCISES: CPT

## 2018-12-17 NOTE — PROGRESS NOTES
PT DAILY TREATMENT NOTE     Patient Name: Angel Yi  Date:2018  : 1940  [x]  Patient  Verified  Payor: Nathalie Fabiola / Plan: [place] / Product Type: Medicare /    In time: 571      Out time: 553  Total Treatment Time (min): 52  Total Timed Codes (min): 42  1:1 Treatment Time (min): 730- 808 (38)  Visit #: 4 of 8-10    Treatment Area: No admission diagnoses are documented for this encounter. SUBJECTIVE  Pain Level (0-10 scale): .5 - 1   Any medication changes, allergies to medications, adverse drug reactions, diagnosis change, or new procedure performed?: [x] No    [] Yes (see summary sheet for update)  Subjective functional status/changes:   [] No changes reported  \"I can feel I am getting stronger. \"    OBJECTIVE  Modality rationale: decrease edema, decrease inflammation and decrease pain to improve the patients ability to > comfort with static positioning, reading     Min Type Additional Details    []  Ultrasound: []Continuous   [] Pulsed                           []1MHz   []3MHz Location:  W/cm2:   10 [x]  Ice     []  heat  []  Ice massage Position: reclined   Location: (L) wrist   [x] Skin assessment post-treatment:  [x]intact    []redness- no adverse reaction                                                                                 []redness  adverse reaction:     24 min Therapeutic Exercise:  [x] See flow sheet:     Rationale: increase ROM, increase strength, improve coordination and increase proprioception to improve the patients ability to improve gripping, ADLs    18 min Manual Therapy:  (L) wrist retrograde massage, BR and wrist ext DTM, pronator teres TPR , carpal mobs and PROM wrist flexion and extension, rist distraction    Rationale: decrease pain and increase ROM to improve the patient's ability to pull, , perform ADLs          with TE min Patient Education: [x] Review HEP     Other Objective/Functional Measures:     Progressed PRE resistance with bicep curls   Initiated UBE for  and general UE strength   + tissue tension       Pain Level (0-10 scale) post treatment: 0    ASSESSMENT/Changes in Function:   Patient tolerated progression well but was challenged by pronated bicep curls. Pain over styloid process with 2# pronation and supination but tolerable reported by patient . Patient will continue to benefit from skilled PT services to modify and progress therapeutic interventions, address functional mobility deficits, address ROM deficits, address strength deficits, analyze and address soft tissue restrictions, analyze and cue movement patterns, analyze and modify body mechanics/ergonomics and assess and modify postural abnormalities to attain remaining goals. []  See Plan of Care  [x]  See progress note/recertification 01/5/03  []  See Discharge Summary         Progress towards goals / Updated goals: All goals reviewed and progressing appropriately as of 12/17/2018  Long Term Goals: To be accomplished in  8-10  treatments:  1. Pt will increase score on the FOTO to > or = 70/100 to demo an increase in functional activity tolerance. Goal to be assessed at next PN as overall mobility is slow progressing (55/100 at IE)  2. Pt will have an increase in L wrist AROM to > or = 65 degrees extension to improve all gripping activities. -Goal progressing; at 56 deg (15 at IE)  3. Pt will be able to self-manage ave pain to < or = 2/10 to restore ease of movement, self-care, work. Goal progressing; 2-8/10  (2-8 at IE)  4.   Patient will demo increased wrist extension strength to 4+/5 for improved       PLAN  [x]  Upgrade activities as tolerated     [x]  Continue plan of care  []  Update interventions per flow sheet       []  Discharge due to:_  [x]  Other: cont to progress carpal mobility     Zay Gailndo, PT 12/17/2018

## 2018-12-19 ENCOUNTER — HOSPITAL ENCOUNTER (OUTPATIENT)
Dept: PHYSICAL THERAPY | Age: 78
Discharge: HOME | End: 2018-12-19
Payer: MEDICARE

## 2018-12-19 PROCEDURE — 97140 MANUAL THERAPY 1/> REGIONS: CPT

## 2018-12-19 PROCEDURE — 97110 THERAPEUTIC EXERCISES: CPT

## 2018-12-19 NOTE — PROGRESS NOTES
PT DAILY TREATMENT NOTE     Patient Name: Marylene Batten  Date:2018  : 1940  [x]  Patient  Verified  Payor: [place] MEDICARE / Plan: [place] MEDICARE PART A & B / Product Type: Medicare /    In time: 012 Out time: 081  Total Treatment Time (min): 48  Total Timed Codes (min): 38  1:1 Treatment Time (min): 38  Visit #: 5 of 8-10    Treatment Area: Pain in left wrist [M25.532]    SUBJECTIVE  Pain Level (0-10 scale): .5/10  Any medication changes, allergies to medications, adverse drug reactions, diagnosis change, or new procedure performed?: [x] No    [] Yes (see summary sheet for update)  Subjective functional status/changes:   [] No changes reported  \"I am active and energic\"    OBJECTIVE  Modality rationale: decrease edema, decrease inflammation and decrease pain to improve the patients ability to > comfort with static positioning, reading     Min Type Additional Details    []  Ultrasound: []Continuous   [] Pulsed                           []1MHz   []3MHz Location:  W/cm2:   10 [x]  Ice     []  heat  []  Ice massage Position: reclined   Location: (L) wrist   [x] Skin assessment post-treatment:  [x]intact    []redness- no adverse reaction                                                                                 []redness  adverse reaction:     23 min Therapeutic Exercise:  [x] See flow sheet:     Rationale: increase ROM, increase strength, improve coordination and increase proprioception to improve the patients ability to improve gripping, ADLs    15 min Manual Therapy:  (L) wrist retrograde massage, BR and wrist ext DTM, pronator teres TPR , carpal mobs and PROM wrist flexion and extension, wrist distraction   Rationale: decrease pain and increase ROM to improve the patient's ability to pull, , perform ADLs          with TE min Patient Education: [x] Review HEP     Other Objective/Functional Measures:     Wrist extension strength - 4/5    AROM progressing to [facility] [address]     Pain Level (0-10 scale) post treatment: 0    ASSESSMENT/Changes in Function:   Patient reports progressive improvement in overall function. Able to roll dough for cookies. . No progression this session sec to progression last session     Patient will continue to benefit from skilled PT services to modify and progress therapeutic interventions, address functional mobility deficits, address ROM deficits, address strength deficits, analyze and address soft tissue restrictions, analyze and cue movement patterns, analyze and modify body mechanics/ergonomics and assess and modify postural abnormalities to attain remaining goals. []  See Plan of Care  [x]  See progress note/recertification 94/9/79  []  See Discharge Summary         Progress towards goals / Updated goals: Long Term Goals: To be accomplished in  8-10  treatments:  1. Pt will increase score on the FOTO to > or = 70/100 to demo an increase in functional activity tolerance. Goal to be assessed at next PN as overall mobility is slow progressing (55/100 at IE)  2. Pt will have an increase in L wrist AROM to > or = 65 degrees extension to improve all gripping activities. -Goal progressing; at 56 deg (15 at IE)  3. Pt will be able to self-manage ave pain to < or = 2/10 to restore ease of movement, self-care, work. Goal progressing; 2-8/10  (2-8 at IE)  4.   Patient will demo increased wrist extension strength to 4+/5 for improved  goal progressing to 4/5  12/19/18      PLAN  [x]  Upgrade activities as tolerated     [x]  Continue plan of care  []  Update interventions per flow sheet       []  Discharge due to:_  [x]  Other: Patient scheduled through 1/4 - PN at that time     Lino Choi, PT 12/19/2018

## 2018-12-21 ENCOUNTER — HOSPITAL ENCOUNTER (OUTPATIENT)
Dept: PHYSICAL THERAPY | Age: 78
Discharge: HOME | End: 2018-12-21
Payer: MEDICARE

## 2018-12-21 PROCEDURE — 97140 MANUAL THERAPY 1/> REGIONS: CPT

## 2018-12-21 PROCEDURE — 97110 THERAPEUTIC EXERCISES: CPT

## 2018-12-21 NOTE — PROGRESS NOTES
PT DAILY TREATMENT NOTE     Patient Name: Bi Moffett  Date:2018  : 1940  [x]  Patient  Verified  Payor: [place] MEDICARE / Plan: [place] MEDICARE PART A & B / Product Type: Medicare /    In time: 7:00 am          Out time: 7:54 am  Total Treatment Time (min): 54  Total Timed Codes (min): 44  1:1 Treatment Time (min): 39  Visit #: 6 of 8-10    Treatment Area: Pain in left wrist [M25.532]    SUBJECTIVE  Pain Level (0-10 scale): 0.5  Any medication changes, allergies to medications, adverse drug reactions, diagnosis change, or new procedure performed?: [x] No    [] Yes (see summary sheet for update)  Subjective functional status/changes:   [] No changes reported  \"I am making progress! The strength isn't quite there because I couldn't  a pitcher of water. \"    OBJECTIVE  Modality rationale: decrease edema, decrease inflammation and decrease pain to improve the patients ability to > comfort with static positioning, reading     Min Type Additional Details    []  Ultrasound: []Continuous   [] Pulsed                           []1MHz   []3MHz Location:  W/cm2:   10 [x]  Ice     []  heat  []  Ice massage Position: reclined   Location: (L) wrist   [x] Skin assessment post-treatment:  [x]intact    []redness- no adverse reaction                                                                                 []redness  adverse reaction:     29 min Therapeutic Exercise:  [x] See flow sheet: progressed forearm PREs   Rationale: increase ROM, increase strength, improve coordination and increase proprioception to improve the patients ability to improve gripping, ADLs    15 min Manual Therapy:  (L) wrist retrograde massage, BR and wrist ext DTM, pronator teres TPR , carpal mobs (cavitation noted) and PROM wrist flexion and extension, wrist distraction   Rationale: decrease pain and increase ROM to improve the patient's ability to pull, , perform ADLs          with TE min Patient Education: [x] Review HEP - add weighted wrist flexion and extension     Other Objective/Functional Measures:   AROM: flex 51 deg, ext 58 deg, UD/RD WNL, supination 70 deg, pronation 80 deg  Hard end-feel with all PROM. Pain: (A) 0-1 unless lifting moderate weight items    Noted 50% reduction in ROM with progression to 2# hand weight with wrist PREs, therefore, cont'd at 1# with inc reps - no loss of ROM noted. Pain Level (0-10 scale) post treatment: 0 - \"No I feel good\"    ASSESSMENT/Changes in Function:   Patient demos improved wrist/forearm mobility and decreasing pain levels. Notable strength deficits as AROM limited 50% with 2# hand-weight - added to therex with lower resistance to progress strength. Patient will continue to benefit from skilled PT services to modify and progress therapeutic interventions, address functional mobility deficits, address ROM deficits, address strength deficits, analyze and address soft tissue restrictions, analyze and cue movement patterns, analyze and modify body mechanics/ergonomics and assess and modify postural abnormalities to attain remaining goals. []  See Plan of Care  [x]  See progress note/recertification 17/5/26  []  See Discharge Summary         Progress towards goals / Updated goals:  1. Pt will increase score on the FOTO to > or = 70/100 to demo an increase in functional activity tolerance. Goal to be assessed at next PN as overall mobility is slow progressing (55/100 at IE)  2. Pt will have an increase in L wrist AROM to > or = 65 degrees extension to improve all gripping activities. -Goal progressing; (L) wrist AROM extension at 58 deg (12/21/18))  3. Pt will be able to self-manage ave pain to < or = 2/10 to restore ease of movement, self-care, work. -Goal progressing; pt notes averaging 0-1/10 pain unless carrying something \"heavy\" ~ 8 lbs (12/21/18)  4.   Patient will demo increased wrist extension strength to 4+/5 for improved  goal progressing to 4/5  12/19/18 PLAN  [x]  Upgrade activities as tolerated     [x]  Continue plan of care  []  Update interventions per flow sheet       []  Discharge due to:_  [x]  Other: PN due 1/4/19     Clinton Quiroz, PTA 12/21/2018

## 2018-12-28 ENCOUNTER — HOSPITAL ENCOUNTER (OUTPATIENT)
Dept: PHYSICAL THERAPY | Age: 78
Discharge: HOME | End: 2018-12-28
Payer: MEDICARE

## 2018-12-28 PROCEDURE — 97110 THERAPEUTIC EXERCISES: CPT

## 2018-12-28 PROCEDURE — 97140 MANUAL THERAPY 1/> REGIONS: CPT

## 2018-12-28 NOTE — PROGRESS NOTES
PT DAILY TREATMENT NOTE  Patient Name: Glenn Camp Date:2018 : 1940 [x]  Patient  Verified Payor: [place] MEDICARE / Plan: Venecia Calvillo / Product Type: Medicare / In time: 7:02 am            Out time: 8:00 am 
Total Treatment Time (min): 58 Total Timed Codes (min): 48 
1:1 Treatment Time (min): 7:03am-7:43am (40) Visit #: 7 of 8-10 Treatment Area: Pain in left wrist [M25.532] SUBJECTIVE Pain Level (0-10 scale): 0.5 Any medication changes, allergies to medications, adverse drug reactions, diagnosis change, or new procedure performed?: [x] No    [] Yes (see summary sheet for update) Subjective functional status/changes:   [] No changes reported Patient notes pain continues to increase with holding light items ~5 lbs for > 10 minutes in supinated position. Patient notes improved ability to lift items like laundry basket without pain. Notes decreased overall pain. OBJECTIVEModality rationale: decrease edema, decrease inflammation and decrease pain to improve the patients ability to > comfort with static positioning, reading Min Type Additional Details []  Ultrasound: []Continuous   [] Pulsed []1MHz   []3MHz Location: 
W/cm2:  
10 [x]  Ice     []  heat 
[]  Ice massage Position: reclined Location: (L) wrist  
[x] Skin assessment post-treatment:  [x]intact    []redness- no adverse reaction 
                                                                               []redness  adverse reaction:  
 
33 min Therapeutic Exercise:  [x] See flow sheet: added [employer] SA punch in ~100 deg flexion, emphasis on gripping Rationale: increase ROM, increase strength, improve coordination and increase proprioception to improve the patients ability to improve gripping, ADLs 15 min Manual Therapy:  (L) wrist retrograde massage, BR and wrist ext DTM, pronator teres TPR , carpal mobs and PROM wrist flexion and extension, wrist distraction Rationale: decrease pain and increase ROM to improve the patient's ability to pull, , perform ADLs 
       
with TE min Patient Education: [x] Review HEP - added SA punch with YTB provided Other Objective/Functional Measures:  
Wrist EXT PROM: 73 deg with the elbow flexed Wrist FLEX AROM: 45 deg Pain Level (0-10 scale) post treatment: 0 
 
ASSESSMENT/Changes in Function:  
Notable capsular tightness addressed well with joint distraction. SLowly progressing AROM of the forearm and wrist, although, patient notes strength Patient will continue to benefit from skilled PT services to modify and progress therapeutic interventions, address functional mobility deficits, address ROM deficits, address strength deficits, analyze and address soft tissue restrictions, analyze and cue movement patterns, analyze and modify body mechanics/ergonomics and assess and modify postural abnormalities to attain remaining goals. []  See Plan of Care [x]  See progress note/recertification 71/5/54 []  See Discharge Summary Progress towards goals / Updated goals: 1. Pt will increase score on the FOTO to > or = 70/100 to demo an increase in functional activity tolerance. Goal to be assessed at next PN as overall mobility is slow progressing (55/100 at IE) 2. Pt will have an increase in L wrist AROM to > or = 65 degrees extension to improve all gripping activities. -Goal progressing; (L) wrist AROM extension at 58 deg (12/21/18)) 3. Pt will be able to self-manage ave pain to < or = 2/10 to restore ease of movement, self-care, work. -Goal progressing; pt notes averaging 0-1/10 pain unless carrying something \"heavy\" ~ 8 lbs (12/21/18) 4. Patient will demo increased wrist extension strength to 4+/5 for improved  goal progressing to 4/5  12/19/18 PLAN [x]  Upgrade activities as tolerated     [x]  Continue plan of care 
[]  Update interventions per flow sheet []  Discharge due to:_ 
[x]  Other: PN due 1/4/19; progress t-band resistance with SA punch 
 
Jeremias Luu, PTA 12/28/2018

## 2018-12-31 ENCOUNTER — HOSPITAL ENCOUNTER (OUTPATIENT)
Dept: PHYSICAL THERAPY | Age: 78
Discharge: HOME | End: 2018-12-31
Payer: MEDICARE

## 2018-12-31 PROCEDURE — 97110 THERAPEUTIC EXERCISES: CPT

## 2018-12-31 PROCEDURE — 97140 MANUAL THERAPY 1/> REGIONS: CPT

## 2018-12-31 NOTE — PROGRESS NOTES
PT DAILY TREATMENT NOTE  Patient Name: Gabriela Mckay Date:2018 : 1940 [x]  Patient  Verified Payor: [place] MEDICARE / Plan: Venecia Elizabeth y / Product Type: Medicare / In time:730       Out time: 574 Total Treatment Time (min): 54 Total Timed Codes (min): 44 
1:1 Treatment Time (min): 700- 738 (38) Visit #: 8 of 8-10 Treatment Area: Pain in left wrist [M25.532] SUBJECTIVE Pain Level (0-10 scale): .5/10 Any medication changes, allergies to medications, adverse drug reactions, diagnosis change, or new procedure performed?: [x] No    [] Yes (see summary sheet for update) Subjective functional status/changes:   [] No changes reported \"I am doing just about everything with my hand. I still can't lift some heavy things. \" OBJECTIVEModality rationale: decrease edema, decrease inflammation and decrease pain to improve the patients ability to > comfort with static positioning, reading Min Type Additional Details []  Ultrasound: []Continuous   [] Pulsed []1MHz   []3MHz Location: 
W/cm2:  
10 [x]  Ice     []  heat 
[]  Ice massage Position: reclined Location: (L) wrist  
[x] Skin assessment post-treatment:  [x]intact    []redness- no adverse reaction 
                                                                               []redness  adverse reaction:  
 
26 min Therapeutic Exercise:  [x] See flow sheet:   
Rationale: increase ROM, increase strength, improve coordination and increase proprioception to improve the patients ability to improve gripping, ADLs 17 min Manual Therapy:  (L) wrist distraction, PROM all directions, syndesmosis massage, grade 2-3 joint mobs all directions Rationale: decrease pain and increase ROM to improve the patient's ability to pull, , perform ADLs 
       
with TE min Patient Education: [x] Review HEP - added SA punch with YTB provided Other Objective/Functional Measures: LTG 4 - wrist extension strength : 3+ with pain Pain Level (0-10 scale) post treatment: 0 
 
ASSESSMENT/Changes in Function:  
Patient progressing well with ROM and joint mobility. . Strength is still limiting factor. Discuss decrease freq of PT to 2x per week for next session of visits. Patient will continue to benefit from skilled PT services to modify and progress therapeutic interventions, address functional mobility deficits, address ROM deficits, address strength deficits, analyze and address soft tissue restrictions, analyze and cue movement patterns, analyze and modify body mechanics/ergonomics and assess and modify postural abnormalities to attain remaining goals. []  See Plan of Care [x]  See progress note/recertification 59/7/34 []  See Discharge Summary Progress towards goals / Updated goals: 1. Pt will increase score on the FOTO to > or = 70/100 to demo an increase in functional activity tolerance. Goal to be assessed at next PN as overall mobility is slow progressing (55/100 at IE) 2. Pt will have an increase in L wrist AROM to > or = 65 degrees extension to improve all gripping activities. -Goal progressing; (L) wrist AROM extension at 58 deg (12/21/18)) 3. Pt will be able to self-manage ave pain to < or = 2/10 to restore ease of movement, self-care, work. -Goal progressing; pt notes averaging 0-1/10 pain unless carrying something \"heavy\" ~ 8 lbs (12/21/18) 4. Patient will demo increased wrist extension strength to 4+/5 for improved  goal progressing 3+/5  12/31/18 PLAN [x]  Upgrade activities as tolerated     [x]  Continue plan of care 
[]  Update interventions per flow sheet      
[]  Discharge due to:_ 
[x]  Other: PN due 1/4/19 - Friday Yuridia Gauthier, PT 12/31/2018

## 2019-01-02 ENCOUNTER — HOSPITAL ENCOUNTER (OUTPATIENT)
Dept: PHYSICAL THERAPY | Age: 79
Discharge: HOME | End: 2019-01-02
Payer: MEDICARE

## 2019-01-02 PROCEDURE — 97140 MANUAL THERAPY 1/> REGIONS: CPT

## 2019-01-02 PROCEDURE — 97110 THERAPEUTIC EXERCISES: CPT

## 2019-01-02 NOTE — PROGRESS NOTES
PT DAILY TREATMENT NOTE  Patient Name: Carmela Anderson Date:2019 : 1940 [x]  Patient  Verified Payor: [place] MEDICARE / Plan: Venecia Elizabeth y / Product Type: Medicare / In time:731 Out time: 823 Total Treatment Time (min): 52 Total Timed Codes (min): 42 
1:1 Treatment Time (min): 733 -  811 (38) Visit #: 9 of 8-10 Treatment Area: Pain in left wrist [M25.532] SUBJECTIVE Pain Level (0-10 scale): .5/10 Any medication changes, allergies to medications, adverse drug reactions, diagnosis change, or new procedure performed?: [x] No    [] Yes (see summary sheet for update) Subjective functional status/changes:   [] No changes reported \"No pain today, but I had some pain yesterday. Maybe the weather. \" 
 
OBJECTIVEModality rationale: decrease edema, decrease inflammation and decrease pain to improve the patients ability to > comfort with static positioning, reading Min Type Additional Details []  Ultrasound: []Continuous   [] Pulsed []1MHz   []3MHz Location: 
W/cm2:  
10 [x]  Ice WRIST      [x]  Heat FOREARM MM 
[]  Ice massage Position:seated Location: (L) wrist  
[x] Skin assessment post-treatment:  [x]intact    []redness- no adverse reaction 
                                                                               []redness  adverse reaction:  
 
27 min Therapeutic Exercise:  [x] See flow sheet:   
Rationale: increase ROM, increase strength, improve coordination and increase proprioception to improve the patients ability to improve gripping, ADLs 15 min Manual Therapy:  (L) wrist distraction, PROM all directions,STM/DTM wrist extensors Rationale: decrease pain and increase ROM to improve the patient's ability to pull, , perform ADLs 
       
with TE min Patient Education: [x] Review HEP- heat for m soreness. Other Objective/Functional Measures: Added 3 way wall push ups for WBing strengthening Pain Level (0-10 scale) post treatment: 0 
 
ASSESSMENT/Changes in Function:  
Patient tolerated wall push ups well without co pain. Myofascial tightness of wrist extensors still present addressed with MT today. Added MHP to wrist ext mm today while icing wrist for improved mobility and encouraged use of heat at home for m soreness. Reassessment NV for continuation 2x4 discussed with patient - patient agreeable. Patient will continue to benefit from skilled PT services to modify and progress therapeutic interventions, address functional mobility deficits, address ROM deficits, address strength deficits, analyze and address soft tissue restrictions, analyze and cue movement patterns, analyze and modify body mechanics/ergonomics and assess and modify postural abnormalities to attain remaining goals. []  See Plan of Care [x]  See progress note/recertification 11/9/27 []  See Discharge Summary Progress towards goals / Updated goals: ALL GOALS TO BE FORMALLY ASSESSED FOR CONTINUATION NV 1/2/19 1. Pt will increase score on the FOTO to > or = 70/100 to demo an increase in functional activity tolerance. Goal to be assessed at next PN as overall mobility is slow progressing (55/100 at IE) 2. Pt will have an increase in L wrist AROM to > or = 65 degrees extension to improve all gripping activities. -Goal progressing; (L) wrist AROM extension at 58 deg (12/21/18)) 3. Pt will be able to self-manage ave pain to < or = 2/10 to restore ease of movement, self-care, work. -Goal progressing; pt notes averaging 0-1/10 pain unless carrying something \"heavy\" ~ 8 lbs (12/21/18) 4. Patient will demo increased wrist extension strength to 4+/5 for improved  goal progressing 3+/5  12/31/18 PLAN [x]  Upgrade activities as tolerated     [x]  Continue plan of care 
[]  Update interventions per flow sheet      
[]  Discharge due to:_ 
[x]  Other: PN due NV - cont 2xr4 for progressive strengthening Ronny Phillip, PT 1/2/2019

## 2019-01-04 ENCOUNTER — HOSPITAL ENCOUNTER (OUTPATIENT)
Dept: PHYSICAL THERAPY | Age: 79
Discharge: HOME | End: 2019-01-04
Payer: MEDICARE

## 2019-01-04 PROCEDURE — 97110 THERAPEUTIC EXERCISES: CPT

## 2019-01-04 PROCEDURE — 97140 MANUAL THERAPY 1/> REGIONS: CPT

## 2019-01-04 PROCEDURE — G8985 CARRY GOAL STATUS: HCPCS

## 2019-01-04 PROCEDURE — G8984 CARRY CURRENT STATUS: HCPCS

## 2019-01-04 NOTE — PROGRESS NOTES
PT DAILY TREATMENT NOTE  Patient Name: Mike Concepcion Date:2019 : 1940 [x]  Patient  Verified Payor: [place] [employer] / Plan: Venecia Lainezy / Product Type: Medicare / In time: 7:30 am     Out time: 8:30 am 
Total Treatment Time (min): 60 Total Timed Codes (min): 50 
1:1 Treatment Time (min): 4:94QI-8:54EW (39) Visit #: 10 of 8-10 Treatment Area: Pain in left wrist [M25.532] SUBJECTIVE Pain Level (0-10 scale): 0.5 Any medication changes, allergies to medications, adverse drug reactions, diagnosis change, or new procedure performed?: [x] No    [] Yes (see summary sheet for update) Subjective functional status/changes:   [] No changes reported \"I can just about do anything but lift a heavy basket of laundry or the cat litter. The strength is not there and it is aggravating, but the pain is much much less. \" OBJECTIVEModality rationale: decrease edema, decrease inflammation and decrease pain to improve the patients ability to > comfort with static positioning, reading Min Type Additional Details []  Ultrasound: []Continuous   [] Pulsed []1MHz   []3MHz Location: 
W/cm2:  
10 [x]  Ice WRIST      [x]  Heat FOREARM MM 
[]  Ice massage Position:seated Location: (L) wrist  
[x] Skin assessment post-treatment:  [x]intact    []redness- no adverse reaction 
                                                                               []redness  adverse reaction:  
 
35 min Therapeutic Exercise:  [x] See flow sheet:   
Rationale: increase ROM, increase strength, improve coordination and increase proprioception to improve the patients ability to improve gripping, ADLs 15 min Manual Therapy:  (L) wrist distraction, PROM all directions,STM/DTM wrist extensors; reassessment Rationale: decrease pain and increase ROM to improve the patient's ability to pull, , perform ADLs with TE min Patient Education: [x] Review HEP- heat for m soreness. Other Objective/Functional Measures:  
Subjective improvement in 70% in symptoms since IE. Improvements: general use - \"everything\", carrying purse, reaching, pulling, pushing Deficits: lifting heavier weight (ie. heavy laundry basket, cat litter, groceries) Wrist AROM : flexion 51 deg, extension 66 deg, pronation/supnation 90 deg (L) wrist strength: flex 4+/5, ext 4/5 with pain, RD 4+/5 (L) elbow AROM: WNL 
(L) elbow strength: 5/5 Edema at (L) styloid: 1.0 cm difference from (R) UE Finger extension - nonpainful PROM Pain: (B) 0, (W) 6 with lifting laundry basket Pain Level (0-10 scale) post treatment: 0 
 
ASSESSMENT/Changes in Function:  
See PN. Patient will continue to benefit from skilled PT services to modify and progress therapeutic interventions, address functional mobility deficits, address ROM deficits, address strength deficits, analyze and address soft tissue restrictions, analyze and cue movement patterns, analyze and modify body mechanics/ergonomics and assess and modify postural abnormalities to attain remaining goals. []  See Plan of Care [x]  See progress note/recertification (7/0/88) []  See Discharge Summary Progress towards goals / Updated goals: 1. Pt will increase score on the FOTO to > or = 70/100 to demo an increase in functional activity tolerance. -Goal progressing; inc to 63/100 from 55/100 at last assessment 2. Pt will have an increase in L wrist AROM to > or = 65 degrees extension to improve all gripping activities. -Goal met; inc to 66 deg wrist EXT from 56 deg at last assessment 3. Pt will be able to self-manage ave pain to < or = 2/10 to restore ease of movement, self-care, work. -Goal met; pt notes averaging 0-1/10 pain unless carrying something \"heavy\" ~ 8 lbs (at last assessment, 2-3/10 average pain) 4.   Patient will demo increased wrist extension strength to 4+/5 for improved. -Goal slowly progressing with min inc to 4/5 from 4-/5 at last assessment PLAN [x]  Upgrade activities as tolerated     [x]  Continue plan of care 
[]  Update interventions per flow sheet      
[]  Discharge due to:_ 
[x]  Other: see PN; cont 2x4 Yana Brasher, PTA 1/4/2019

## 2019-01-08 ENCOUNTER — HOSPITAL ENCOUNTER (OUTPATIENT)
Dept: PHYSICAL THERAPY | Age: 79
Discharge: HOME | End: 2019-01-08
Payer: MEDICARE

## 2019-01-08 PROCEDURE — 97110 THERAPEUTIC EXERCISES: CPT

## 2019-01-08 PROCEDURE — 97140 MANUAL THERAPY 1/> REGIONS: CPT

## 2019-01-08 NOTE — PROGRESS NOTES
PT DAILY TREATMENT NOTE  Patient Name: Rafal Somers Date:2019 : 1940 [x]  Patient  Verified Payor: [place] [employer] / Plan: Venecia Elizabeth y / Product Type: Medicare / In time: 7:31 am                Out time: 8:30 am 
Total Treatment Time (min): 59 Total Timed Codes (min): 49 
1:1 Treatment Time (min): 7:40am-8:20am (40) Visit #: 1 of 8-10 Treatment Area: Pain in left wrist [M25.532] SUBJECTIVE Pain Level (0-10 scale): 0.5 Any medication changes, allergies to medications, adverse drug reactions, diagnosis change, or new procedure performed?: [x] No    [] Yes (see summary sheet for update) Subjective functional status/changes:   [] No changes reported \"I am able to lift the laundry basket now, but I get my  to open the tight jars for me. \" OBJECTIVEModality rationale: decrease edema, decrease inflammation and decrease pain to improve the patients ability to > comfort with static positioning, reading Min Type Additional Details []  Ultrasound: []Continuous   [] Pulsed []1MHz   []3MHz Location: 
W/cm2:  
10 [x]  Ice WRIST      [x]  Heat FOREARM MM 
[]  Ice massage Position:seated Location: (L) wrist  
[x] Skin assessment post-treatment:  [x]intact    []redness- no adverse reaction 
                                                                               []redness  adverse reaction:  
 
32 min Therapeutic Exercise:  [x] See flow sheet:   
Rationale: increase ROM, increase strength, improve coordination and increase proprioception to improve the patients ability to improve gripping, ADLs 17 min Manual Therapy:  (L) wrist distraction, PROM all directions, STM/DTM wrist extensors and flexors Rationale: decrease pain and increase ROM to improve the patient's ability to pull, , perform ADLs 
       
with TE min Patient Education: [x] Review HEP Other Objective/Functional Measures: Subjective improvement in 70% in symptoms since IE. Improvements: general use - \"everything\", carrying purse, reaching, pulling, pushing Deficits: lifting heavier weight (ie. heavy laundry basket, cat litter, groceries) Wrist AROM : flexion 51 deg, extension 66 deg, pronation/supnation 90 deg (L) wrist strength: flex 4+/5, ext 4/5 with pain, RD 4+/5 (L) elbow AROM: WNL 
(L) elbow strength: 5/5 Edema at (L) styloid: 1.0 cm difference from (R) UE Finger extension - nonpainful PROM Pain: (B) 0, (W) 6 with lifting laundry basket Pain Level (0-10 scale) post treatment: 0 
 
ASSESSMENT/Changes in Function:  
Slowly improving wrist mobility. Mild hypomobility into RD addressed well and normalized with MT. See PN. Patient will continue to benefit from skilled PT services to modify and progress therapeutic interventions, address functional mobility deficits, address ROM deficits, address strength deficits, analyze and address soft tissue restrictions, analyze and cue movement patterns, analyze and modify body mechanics/ergonomics and assess and modify postural abnormalities to attain remaining goals. []  See Plan of Care [x]  See progress note/recertification (6/6/72) []  See Discharge Summary Progress towards goals / Updated goals: 1. Patient will increase score on the FOTO to > or = 70/100 to demo an increase in functional activity tolerance. 2.  Patient will demo symmetrical  strength to improve ease of opening jars for cooking/baking. 3.  Patient will demo increased wrist extension strength to 4+/5 to improve ease with lifting. 4.  Patient will demo increased wrist flexion AROM to 65 deg to improve reach, carrying, ADL completion. PLAN [x]  Upgrade activities as tolerated     [x]  Continue plan of care 
[]  Update interventions per flow sheet      
[]  Discharge due to:_ 
[x]  Other: see PN; cont 2-3x4 Louvella Danger, PTA 1/8/2019

## 2019-01-08 NOTE — PROGRESS NOTES
[address] [facility] [address] [facility] AT [address]. [address] [place] [address] Phone: [phone] Fax: [phone] PROGRESS NOTE Patient Name: Hugo Heredia : 1940 Treatment/Medical Diagnosis: Pain in left wrist [M25.532] Referral Source: Mayank Newman, * Date of Initial Visit: 18 Attended Visits: 18 Missed Visits: 0  
SUMMARY OF TREATMENTPatient is being treated for L wrist radial styloid fracture after fall at the gym. Treatment has included progressive therex for ROM/strength, flexibility and elbow strengthening, manual mobilizations and ice/heat contrasts for edema. CURRENT STATUSPatient cont's to make good progress in PT, as evidenced by reducing pain levels, improving wrist/forearm AROM, and increasing UE strength. Patient notes improvements in fine motors functions, stating she has returned to knitting. Main deficits are strength related at this time, as patient reports difficulty opening tight jars and lifting moderate/heavy weight (ie. loaded laundry basket or cast iron skillet). Assessment as follows: 
Subjective improvement in 70% in symptoms since IE. Additional Improvements: general use - \"everything\", carrying purse, reaching, pulling, pushing FOTO score: 63/100 (at last assessment, 55/100) Wrist AROM : flexion 51 deg, extension 66 deg, pronation/supnation 90 deg (L) wrist strength: flex 4+/5, ext 4/5 with pain, RD 4+/5 (L) elbow AROM: WNL 
(L) elbow strength: 5/5 Edema at (L) styloid: 1.0 cm difference from (R) UE Finger extension - nonpainful PROM  strength: reduced 25% from (R) UE  
Pain: (B) 0, (W) 6 with lifting laundry basket Goal/Measure of Progress 1. Pt will increase score on the FOTO to > or = 70/100 to demo an increase in functional activity tolerance. -Goal progressing; inc to 63/100 from 55/100 at last assessment 2.  Pt will have an increase in L wrist AROM to > or = 65 degrees extension to improve all gripping activities.     -Goal met; inc to 66 deg wrist EXT from 56 deg at last assessment 3. Pt will be able to self-manage ave pain to < or = 2/10 to restore ease of movement, self-care, work. -Goal met; pt notes averaging 0-1/10 pain unless carrying something \"heavy\" ~ 8 lbs (at last assessment, 2-3/10 average pain) 4.  Patient will demo increased wrist extension strength to 4+/5 for improved . -Goal slowly progressing with min inc to 4/5 from 4-/5 at last assessment New Goals to be achieved in __8-10__  treatments: 1. Patient will increase score on the FOTO to > or = 70/100 to demo an increase in functional activity tolerance. 2.  Patient will demo symmetrical  strength to improve ease of opening jars for cooking/baking. 3.  Patient will demo increased wrist extension strength to 4+/5 to improve ease with lifting. 4.  Patient will demo increased wrist flexion AROM to 65 deg to improve reach, carrying, ADL completion. Mobility: [id number] Current  CJ= 20-39%   Goal  CJ= 20-39%. The severity rating is based on the FOTO Score RECOMMENDATIONS Recommend continue skilled PT at 2-3x4 for 8-10 treatments to progress strength and achieve stated goals to return patient towards PLOF. If you have any questions/comments please contact us directly at [phone]. Thank you for allowing us to assist in the care of your patient. Reporting Period: 11/5/18-1/8/19 Date: 1/8/2019 PT Signature: Danielle Hdez DPT  Time: 137pm   
NOTE TO PHYSICIAN:  PLEASE COMPLETE THE ORDERS BELOW AND FAX TO [facility] at [facility]: [phone].  
If you are unable to process this request in 24 hours please contact our office: [phone]. 
___ I have read the above report and request that my patient continue as recommended.  
___ I have read the above report and request that my patient continue therapy with the following changes/special instructions:_________________________________________________________  
___ I have read the above report and request that my patient be discharged from therapy.   
 
Physician Signature:       Date:      Time:

## 2019-01-10 ENCOUNTER — HOSPITAL ENCOUNTER (OUTPATIENT)
Dept: PHYSICAL THERAPY | Age: 79
Discharge: HOME | End: 2019-01-10
Payer: MEDICARE

## 2019-01-10 PROCEDURE — 97140 MANUAL THERAPY 1/> REGIONS: CPT

## 2019-01-10 PROCEDURE — 97110 THERAPEUTIC EXERCISES: CPT

## 2019-01-10 NOTE — PROGRESS NOTES
PT DAILY TREATMENT NOTE  Patient Name: Jen Nicholson Date:1/10/2019 : 1940 [x]  Patient  Verified Payor: [place] MED[place] / Plan: Venecia Calvillo / Product Type: Medicare / In time: 8:00 am                Out time: 8:56 am 
Total Treatment Time (min): 56 Total Timed Codes (min): 46 
1:1 Treatment Time (min): 40 Visit #: 2 of 8-10 Treatment Area: Pain in left wrist [M25.532] SUBJECTIVE Pain Level (0-10 scale): 0; 9 in L/S Any medication changes, allergies to medications, adverse drug reactions, diagnosis change, or new procedure performed?: [x] No    [] Yes (see summary sheet for update) Subjective functional status/changes:   [] No changes reported \"My wrist is fine, but my back is what's hurting me. It came out of the blue. I am going to see the chiropractor since he adjusted my back on Monday. \" OBJECTIVEModality rationale: decrease edema, decrease inflammation and decrease pain to improve the patients ability to > comfort with static positioning, reading Min Type Additional Details []  Ultrasound: []Continuous   [] Pulsed []1MHz   []3MHz Location: 
W/cm2:  
10 [x]  Ice WRIST      [x]  Heat FOREARM MM 
[]  Ice massage Position:seated Location: (L) wrist  
[x] Skin assessment post-treatment:  [x]intact    []redness- no adverse reaction 
                                                                               []redness  adverse reaction:  
 
35 min Therapeutic Exercise:  [x] See flow sheet: added tricep extensions with wrist UD, wrist UD; progressed gripper Rationale: increase ROM, increase strength, improve coordination and increase proprioception to improve the patients ability to improve gripping, ADLs 11 min Manual Therapy:  (L) wrist distraction, PROM all directions, STM/DTM wrist extensors and flexors Rationale: decrease pain and increase ROM to improve the patient's ability to pull, , perform ADLs with TE min Patient Education: [x] Review HEP; add tricep extensions and wrist UD Other Objective/Functional Measures:   
Wrist flexion AROM with pt in full forearm pronation: 55 deg Pain Level (0-10 scale) post treatment: 0 
 
ASSESSMENT/Changes in Function:  
Patient notes pain-free in the wrist today. Notable cavitations in the wrist with significant increase in flexion AROM Patient will continue to benefit from skilled PT services to modify and progress therapeutic interventions, address functional mobility deficits, address ROM deficits, address strength deficits, analyze and address soft tissue restrictions, analyze and cue movement patterns, analyze and modify body mechanics/ergonomics and assess and modify postural abnormalities to attain remaining goals. []  See Plan of Care [x]  See progress note/recertification (7/7/19) []  See Discharge Summary Progress towards goals / Updated goals: 1. Patient will increase score on the FOTO to > or = 70/100 to demo an increase in functional activity tolerance. 2.  Patient will demo symmetrical  strength to improve ease of opening jars for cooking/baking. 3.  Patient will demo increased wrist extension strength to 4+/5 to improve ease with lifting. 4.  Patient will demo increased wrist flexion AROM to 65 deg to improve reach, carrying, ADL completion. -Goal progressing; pt demos 55 deg wrist flexion AROM (1/10/19) PLAN [x]  Upgrade activities as tolerated     [x]  Continue plan of care 
[]  Update interventions per flow sheet      
[]  Discharge due to:_ 
[]  Other:_ 
 
Raul Pedraza, PTA 1/10/2019

## 2019-01-14 ENCOUNTER — HOSPITAL ENCOUNTER (OUTPATIENT)
Dept: PHYSICAL THERAPY | Age: 79
Discharge: HOME | End: 2019-01-14
Payer: MEDICARE

## 2019-01-14 PROCEDURE — 97140 MANUAL THERAPY 1/> REGIONS: CPT

## 2019-01-14 PROCEDURE — 97110 THERAPEUTIC EXERCISES: CPT

## 2019-01-14 NOTE — PROGRESS NOTES
PT DAILY TREATMENT NOTE  Patient Name: Juliane Schreiber Date:2019 : 1940 [x]  Patient  Verified Payor: [place] MEDICARE / Plan: Venecia Elizabeth y / Product Type: Medicare / In time:729   Out time: 823 Total Treatment Time (min): 54 Total Timed Codes (min): 44 
1:1 Treatment Time (min): 729 - 807 (38) Visit #: 3 of 8-10 Treatment Area: Pain in left wrist [M25.532] SUBJECTIVE Pain Level (0-10 scale):0/10 wrist  
Any medication changes, allergies to medications, adverse drug reactions, diagnosis change, or new procedure performed?: [x] No    [] Yes (see summary sheet for update) Subjective functional status/changes:   [] No changes reported \"My back is better but not best. I have hardly any pain in my wrist \" OBJECTIVEModality rationale: decrease edema, decrease inflammation and decrease pain to improve the patients ability to > comfort with static positioning, reading Min Type Additional Details []  Ultrasound: []Continuous   [] Pulsed []1MHz   []3MHz Location: 
W/cm2:  
10 [x]  Ice WRIST      [x]  Heat FOREARM MM 
[]  Ice massage Position:seated Location: (L) wrist  
[x] Skin assessment post-treatment:  [x]intact    []redness- no adverse reaction 
                                                                               []redness  adverse reaction:  
 
34 min Therapeutic Exercise:  [x] See flow sheet:   
Rationale: increase ROM, increase strength, improve coordination and increase proprioception to improve the patients ability to improve gripping, ADLs 10 min Manual Therapy:  (L) wrist distraction, PROM all directions, STM/DTM wrist extensors and flexors Rationale: decrease pain and increase ROM to improve the patient's ability to pull, , perform ADLs 
       
with TE min Patient Education: [x] Review HEP Other Objective/Functional Measures:   
 : progressed to [facility] PRE progression- [facility] progressed to 5# bicep curl Pain Level (0-10 scale) post treatment: 0 
 
ASSESSMENT/Changes in Function:  
Patient tolerated PRE progression well. VCing for full ROM with bicep curls. 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 Strength is progressing and patient is feeling more confident with DC at end of current program  
 
Patient will continue to benefit from skilled PT services to modify and progress therapeutic interventions, address functional mobility deficits, address ROM deficits, address strength deficits, analyze and address soft tissue restrictions, analyze and cue movement patterns, analyze and modify body mechanics/ergonomics and assess and modify postural abnormalities to attain remaining goals. []  See Plan of Care [x]  See progress note/recertification (3/6/03) []  See Discharge Summary Progress towards goals / Updated goals: 1. Patient will increase score on the FOTO to > or = 70/100 to demo an increase in functional activity tolerance. 2.  Patient will demo symmetrical  strength to improve ease of opening jars for cooking/baking. Goal progressing we;l with progression to [facility] gripper  1/14/9 3. Patient will demo increased wrist extension strength to 4+/5 to improve ease with lifting. 4.  Patient will demo increased wrist flexion AROM to 65 deg to improve reach, carrying, ADL completion. -Goal progressing; pt demos 55 deg wrist flexion AROM (1/10/19) PLAN [x]  Upgrade activities as tolerated     [x]  Continue plan of care 
[]  Update interventions per flow sheet      
[]  Discharge due to:_ 
[x]  Other: cont current program for strength and lifting _ 
 
Andra Avila, PT 1/14/2019

## 2019-01-16 ENCOUNTER — HOSPITAL ENCOUNTER (OUTPATIENT)
Dept: PHYSICAL THERAPY | Age: 79
Discharge: HOME | End: 2019-01-16
Payer: MEDICARE

## 2019-01-16 PROCEDURE — 97140 MANUAL THERAPY 1/> REGIONS: CPT

## 2019-01-16 PROCEDURE — 97110 THERAPEUTIC EXERCISES: CPT

## 2019-01-16 NOTE — PROGRESS NOTES
PT DAILY TREATMENT NOTE  Patient Name: Claudio Osgood Date:2019 : 1940 [x]  Patient  Verified Payor: [place] MEDICARE / Plan: Venecia Lainezy / Product Type: Medicare / In time:730 Out time: 512 Total Treatment Time (min): 48 Total Timed Codes (min): 38 
1:1 Treatment Time (min): 730 - 808 (38) Visit #: 4 of 8-10 Treatment Area: Pain in left wrist [M25.532] SUBJECTIVE Pain Level (0-10 scale):0/10 wrist  
Any medication changes, allergies to medications, adverse drug reactions, diagnosis change, or new procedure performed?: [x] No    [] Yes (see summary sheet for update) Subjective functional status/changes:   [] No changes reported \"I am well thank you! \" OBJECTIVEModality rationale: decrease edema, decrease inflammation and decrease pain to improve the patients ability to > comfort with static positioning, reading Min Type Additional Details []  Ultrasound: []Continuous   [] Pulsed []1MHz   []3MHz Location: 
W/cm2:  
10 [x]  Ice WRIST      [x]  Heat 
[]  Ice massage Position:seated Location: (L) wrist  
[x] Skin assessment post-treatment:  [x]intact    []redness- no adverse reaction 
                                                                               []redness  adverse reaction:  
 
27 min Therapeutic Exercise:  [x] See flow sheet: Assess while on UBE Rationale: increase ROM, increase strength, improve coordination and increase proprioception to improve the patients ability to improve gripping, ADLs 11 min Manual Therapy:  (L) wrist distraction, PROM all directions, STM/DTM wrist extensors and flexors Rationale: decrease pain and increase ROM to improve the patient's ability to pull, , perform ADLs 
       
with TE min Patient Education: [x] Review HEP- rec adding Tricep extension with TB already given Other Objective/Functional Measures: LTG 3- progressed wrist PRE to 3# 
 Patient goal - reduce residual lateral wrist pain Pain Level (0-10 scale) post treatment: 0 
 
ASSESSMENT/Changes in Function:  
Patient reports not adverse reaction to PRE progression for elbow last session. Progressed PRE resistance for wrist today with good tolerance, but challenged by weight increase. Patient reports continued good compliance with HEP for ROM and strengthening Patient will continue to benefit from skilled PT services to modify and progress therapeutic interventions, address functional mobility deficits, address ROM deficits, address strength deficits, analyze and address soft tissue restrictions, analyze and cue movement patterns, analyze and modify body mechanics/ergonomics and assess and modify postural abnormalities to attain remaining goals. []  See Plan of Care [x]  See progress note/recertification (0/7/96) []  See Discharge Summary Progress towards goals / Updated goals: 1. Patient will increase score on the FOTO to > or = 70/100 to demo an increase in functional activity tolerance. 2.  Patient will demo symmetrical  strength to improve ease of opening jars for cooking/baking. Goal progressing we;l with progression to [facility] gripper  1/14/9 3. Patient will demo increased wrist extension strength to 4+/5 to improve ease with lifting. Goal progressing as indicated by wrist PRE progression today 1/6/19 
4. Patient will demo increased wrist flexion AROM to 65 deg to improve reach, carrying, ADL completion. -Goal progressing; pt demos 55 deg wrist flexion AROM (1/10/19) PLAN [x]  Upgrade activities as tolerated     [x]  Continue plan of care 
[]  Update interventions per flow sheet      
[]  Discharge due to:_ 
[x]  Other: cont current program for strength and lifting _ 
 
Rashmi Godfrey, PT 1/16/2019

## 2019-01-21 ENCOUNTER — HOSPITAL ENCOUNTER (OUTPATIENT)
Dept: PHYSICAL THERAPY | Age: 79
Discharge: HOME | End: 2019-01-21
Payer: MEDICARE

## 2019-01-21 PROCEDURE — 97110 THERAPEUTIC EXERCISES: CPT

## 2019-01-21 PROCEDURE — 97140 MANUAL THERAPY 1/> REGIONS: CPT

## 2019-01-21 NOTE — PROGRESS NOTES
PT DAILY TREATMENT NOTE  Patient Name: Hugo Heredia Date:2019 : 1940 [x]  Patient  Verified Payor: [place] / Plan: Venecia Elizabeth y / Product Type: Medicare / In time:731 Out time: 823 Total Treatment Time (min): 52 Total Timed Codes (min): 42 
1:1 Treatment Time (min): 42 Visit #: 5 of 8-10 Treatment Area: Pain in left wrist [M25.532] SUBJECTIVE Pain Level (0-10 scale):0/10 wrist  
Any medication changes, allergies to medications, adverse drug reactions, diagnosis change, or new procedure performed?: [x] No    [] Yes (see summary sheet for update) Subjective functional status/changes:   [] No changes reported \"I have an itch. \" OBJECTIVEModality rationale: decrease edema, decrease inflammation and decrease pain to improve the patients ability to > comfort with static positioning, reading Min Type Additional Details []  Ultrasound: []Continuous   [] Pulsed []1MHz   []3MHz Location: 
W/cm2:  
10 [x]  Ice WRIST      []  Heat 
[]  Ice massage Position:seated Location: (L) wrist  
[x] Skin assessment post-treatment:  [x]intact    []redness- no adverse reaction 
                                                                               []redness  adverse reaction:  
 
33 min Therapeutic Exercise:  [x] See flow sheet: Assess while on UBE Rationale: increase ROM, increase strength, improve coordination and increase proprioception to improve the patients ability to improve gripping, ADLs 
 
9 min Manual Therapy:  (L) wrist distraction, PROM all directions, STM/DTM wrist extensors and flexors Rationale: decrease pain and increase ROM to improve the patient's ability to pull, , perform ADLs 
       
with TE min Patient Education: [x] Review HEP Other Objective/Functional Measures: LTG 3 - Progress wall pushup to table pushup for WBing tolerance Pain Level (0-10 scale) post treatment: 0 
 
 ASSESSMENT/Changes in Function:  
Patient cont with decreased pain levels and progressive improvements in function. Patient challenged by increased PRE reps and was only able to tolerate x 10 normal table pushups at groin height with co increased discomfort. Improving myofascial mobility of wrist extensors noted. Patient will continue to benefit from skilled PT services to modify and progress therapeutic interventions, address functional mobility deficits, address ROM deficits, address strength deficits, analyze and address soft tissue restrictions, analyze and cue movement patterns, analyze and modify body mechanics/ergonomics and assess and modify postural abnormalities to attain remaining goals. []  See Plan of Care [x]  See progress note/recertification (4/5/95) []  See Discharge Summary Progress towards goals / Updated goals: 1. Patient will increase score on the FOTO to > or = 70/100 to demo an increase in functional activity tolerance. 2.  Patient will demo symmetrical  strength to improve ease of opening jars for cooking/baking. Goal progressing we;l with progression to [facility] gripper  1/14/9 3. Patient will demo increased wrist extension strength to 4+/5 to improve ease with lifting. Goal progressing as indicated by wrist PRE progression today 1/6/19 
4. Patient will demo increased wrist flexion AROM to 65 deg to improve reach, carrying, ADL completion. -Goal progressing 1/21/19 PLAN [x]  Upgrade activities as tolerated     [x]  Continue plan of care 
[]  Update interventions per flow sheet      
[]  Discharge due to:_ 
[x]  Other: assess response to [address] Valerie Mora, PT 1/21/2019

## 2019-01-23 ENCOUNTER — HOSPITAL ENCOUNTER (OUTPATIENT)
Dept: PHYSICAL THERAPY | Age: 79
Discharge: HOME | End: 2019-01-23
Payer: MEDICARE

## 2019-01-23 PROCEDURE — 97110 THERAPEUTIC EXERCISES: CPT

## 2019-01-23 PROCEDURE — 97140 MANUAL THERAPY 1/> REGIONS: CPT

## 2019-01-23 NOTE — PROGRESS NOTES
PT DAILY TREATMENT NOTE  Patient Name: Kimani Schultz Date:2019 : 1940 [x]  Patient  Verified Payor: [place] / Plan: Venecia Elizabeth [facility] / Product Type: Medicare / In time:730 Out time: 241 Total Treatment Time (min): 52 Total Timed Codes (min): 42 
1:1 Treatment Time (min): 730 - 808 (38) Visit #: 6 of 8-10 Treatment Area: Pain in left wrist [M25.532] SUBJECTIVE Pain Level (0-10 scale):1/10 wrist  
Any medication changes, allergies to medications, adverse drug reactions, diagnosis change, or new procedure performed?: [x] No    [] Yes (see summary sheet for update) Subjective functional status/changes:   [] No changes reported \"I went back to the gym and I am sore. \" 
 
OBJECTIVEModality rationale: decrease edema, decrease inflammation and decrease pain to improve the patients ability to > comfort with static positioning, reading Min Type Additional Details []  Ultrasound: []Continuous   [] Pulsed []1MHz   []3MHz Location: 
W/cm2:  
10 [x]  Ice WRIST      []  Heat 
[]  Ice massage Position:seated Location: (L) wrist  
[x] Skin assessment post-treatment:  [x]intact    []redness- no adverse reaction 
                                                                               []redness  adverse reaction:  
 
33 min Therapeutic Exercise:  [x] See flow sheet: Assess while on UBE Rationale: increase ROM, increase strength, improve coordination and increase proprioception to improve the patients ability to improve gripping, ADLs 
 
9 min Manual Therapy:  (L) wrist distraction, PROM all directions, TPR distal wrist extensors Rationale: decrease pain and increase ROM to improve the patient's ability to pull, , perform ADLs 
       
with TE min Patient Education: [x] Review HEP Other Objective/Functional Measures:   
 Improvement: return to gym including rope swing , wall push ups and squatting with  Pain Level (0-10 scale) post treatment: 0 
 
ASSESSMENT/Changes in Function:  
Patient progressing well to LTG and has returned to the gym indicating progression to readiness for DC. No progression sec to progression last session with challenge and return to gym. Mild joint swelling and tissue tautness/ tenderness at dorsal wrist sec to increased activitiy- improved with MT. Patient will continue to benefit from skilled PT services to modify and progress therapeutic interventions, address functional mobility deficits, address ROM deficits, address strength deficits, analyze and address soft tissue restrictions, analyze and cue movement patterns, analyze and modify body mechanics/ergonomics and assess and modify postural abnormalities to attain remaining goals. []  See Plan of Care [x]  See progress note/recertification (7/3/79) []  See Discharge Summary Progress towards goals / Updated goals: All goals reviewed and progressing appropriately as of 1/23/2019 1. Patient will increase score on the FOTO to > or = 70/100 to demo an increase in functional activity tolerance. 2.  Patient will demo symmetrical  strength to improve ease of opening jars for cooking/baking. Goal progressing we;l with progression to [facility] gripper  1/14/9 3. Patient will demo increased wrist extension strength to 4+/5 to improve ease with lifting. Goal progressing as indicated by wrist PRE progression today 1/6/19 
4. Patient will demo increased wrist flexion AROM to 65 deg to improve reach, carrying, ADL completion. -Goal progressing 1/21/19 PLAN [x]  Upgrade activities as tolerated     [x]  Continue plan of care 
[]  Update interventions per flow sheet      
[]  Discharge due to:_ 
[x]  Other: Cont 2 ats next week then DC to HEP Anneliese Glynn, PT 1/23/2019

## 2019-01-28 ENCOUNTER — HOSPITAL ENCOUNTER (OUTPATIENT)
Dept: PHYSICAL THERAPY | Age: 79
Discharge: HOME | End: 2019-01-28
Payer: MEDICARE

## 2019-01-28 PROCEDURE — 97110 THERAPEUTIC EXERCISES: CPT

## 2019-01-28 PROCEDURE — 97140 MANUAL THERAPY 1/> REGIONS: CPT

## 2019-01-28 NOTE — PROGRESS NOTES
PT DAILY TREATMENT NOTE  Patient Name: Jen Nicholson Date:2019 : 1940 [x]  Patient  Verified Payor: [place] MED[place]ARE / Plan: Venecia Elizabeth y / Product Type: Medicare / In time:731 Out time: 823 Total Treatment Time (min): 52 Total Timed Codes (min): 42 
1:1 Treatment Time (min): 731 -739 (38) Visit #: 7 of 8-10 Treatment Area: Pain in left wrist [M25.532] SUBJECTIVE Pain Level (0-10 scale):.510 wrist  
Any medication changes, allergies to medications, adverse drug reactions, diagnosis change, or new procedure performed?: [x] No    [] Yes (see summary sheet for update) Subjective functional status/changes:   [] No changes reported \"Not  Much pain, but something is always there. \" 
 
OBJECTIVEModality rationale: decrease edema, decrease inflammation and decrease pain to improve the patients ability to > comfort with static positioning, reading Min Type Additional Details []  Ultrasound: []Continuous   [] Pulsed []1MHz   []3MHz Location: 
W/cm2:  
10 [x]  Ice WRIST      []  Heat 
[]  Ice massage Position:seated Location: (L) wrist  
[x] Skin assessment post-treatment:  [x]intact    []redness- no adverse reaction 
                                                                               []redness  adverse reaction:  
 
32 min Therapeutic Exercise:  [x] See flow sheet: Assess while on UBE Rationale: increase ROM, increase strength, improve coordination and increase proprioception to improve the patients ability to improve gripping, ADLs 10 min Manual Therapy:  (L) wrist distraction, PROM all directions, TPR distal wrist extensors Rationale: decrease pain and increase ROM to improve the patient's ability to pull, , perform ADLs 
       
with TE min Patient Education: [x] Review HEP Other Objective/Functional Measures:   
 Held push ups sec to WBing pain Pain Level (0-10 scale) post treatment: 0 
 
 ASSESSMENT/Changes in Function:  
Patient demo readniess for DC after last scheduled apt - patient aware and in agreence. Patient reports asking  to aid in distraction mobs at home and intends to continue to do so post DC. Cont to be challenged by 3# PRE of wrist but will also continue with strengthening via HEP. Patient will continue to benefit from skilled PT services to modify and progress therapeutic interventions, address functional mobility deficits, address ROM deficits, address strength deficits, analyze and address soft tissue restrictions, analyze and cue movement patterns, analyze and modify body mechanics/ergonomics and assess and modify postural abnormalities to attain remaining goals. []  See Plan of Care [x]  See progress note/recertification (9/6/19) []  See Discharge Summary Progress towards goals / Updated goals: All goals to be formally assessed for DC NV 1/28/19 1. Patient will increase score on the FOTO to > or = 70/100 to demo an increase in functional activity tolerance. 2.  Patient will demo symmetrical  strength to improve ease of opening jars for cooking/baking. Goal progressing we;l with progression to [facility] gripper  1/14/9 3. Patient will demo increased wrist extension strength to 4+/5 to improve ease with lifting. Goal progressing as indicated by wrist PRE progression today 1/6/19 
4. Patient will demo increased wrist flexion AROM to 65 deg to improve reach, carrying, ADL completion. -Goal progressing 1/21/19 PLAN [x]  Upgrade activities as tolerated     [x]  Continue plan of care 
[]  Update interventions per flow sheet      
[]  Discharge due to:_ 
[x]  Other: [address], PT 1/28/2019

## 2019-01-30 ENCOUNTER — HOSPITAL ENCOUNTER (OUTPATIENT)
Dept: PHYSICAL THERAPY | Age: 79
Discharge: HOME | End: 2019-01-30
Payer: MEDICARE

## 2019-01-30 PROCEDURE — G8986 CARRY D/C STATUS: HCPCS

## 2019-01-30 PROCEDURE — G8985 CARRY GOAL STATUS: HCPCS

## 2019-01-30 PROCEDURE — 97110 THERAPEUTIC EXERCISES: CPT

## 2019-01-30 PROCEDURE — G8984 CARRY CURRENT STATUS: HCPCS

## 2019-01-30 PROCEDURE — 97140 MANUAL THERAPY 1/> REGIONS: CPT

## 2019-01-30 NOTE — PROGRESS NOTES
PT DAILY TREATMENT NOTE  Patient Name: Annelise Singh Date:2019 : 1940 [x]  Patient  Verified Payor: [place] MEDICARE / Plan: Venecia Elizabeth y / Product Type: Medicare / In time:730 Out time: 290 Total Treatment Time (min): 52 Total Timed Codes (min): 42 
1:1 Treatment Time (min):730 - 797 (38) Visit #: 8 of 8-10 Treatment Area: Pain in left wrist [M25.532] SUBJECTIVE Pain Level (0-10 scale):.5/10 wrist  
Any medication changes, allergies to medications, adverse drug reactions, diagnosis change, or new procedure performed?: [x] No    [] Yes (see summary sheet for update) Subjective functional status/changes:   [] No changes reported \"I am doing well. \" OBJECTIVEModality rationale: decrease edema, decrease inflammation and decrease pain to improve the patients ability to > comfort with static positioning, reading Min Type Additional Details []  Ultrasound: []Continuous   [] Pulsed []1MHz   []3MHz Location: 
W/cm2:  
10 [x]  Ice WRIST      []  Heat 
[]  Ice massage Position:seated Location: (L) wrist  
[x] Skin assessment post-treatment:  [x]intact    []redness- no adverse reaction 
                                                                               []redness  adverse reaction:  
 
32 min Therapeutic Exercise:  [x] See flow sheet: REASSESS FOR DC INCLUDING WHILE ON UBE Rationale: increase ROM, increase strength, improve coordination and increase proprioception to improve the patients ability to improve gripping, ADLs 10 min Manual Therapy:  (L) wrist distraction, PROM all directions, TPR distal wrist extensors Rationale: decrease pain and increase ROM to improve the patient's ability to pull, , perform ADLs 
       
with TE min Patient Education: [x] Review HEP for DC, DC instructions, Jenny Tommy digiflex Other Objective/Functional Measures:   
 Goals assessed for DC Pain at best .5/10, at worst 3 
 Subjective % improvement 85% Objective:  
 Wrist AROM : flexion 58, extension 72, pronation 92, supination 95 Wrist strength: flexion 4+, extension 4, pronation 4, supination 4 Elbow AROM and strength WNL Improvements:  light to medium lifting, carrying, return to gym activities Deficits heavy lifting, tight gripping / opening tight jars Pain Level (0-10 scale) post treatment: 0 
 
ASSESSMENT/Changes in Function:  
[x]  See Discharge Summary Progress towards goals / Updated goals: 1. Patient will increase score on the FOTO to > or = 70/100 to demo an increase in functional activity tolerance. Goal neat met at 69/100 (63/100 at last assess) 2. Patient will demo symmetrical  strength to improve ease of opening jars for cooking/baking. Goal met - patient demo B symm   (decreased L at last assess) 3. Patient will demo increased wrist extension strength to 4+/5 to improve ease with lifting. Goal  Met at 4+/5 (4/5 at last assess ) 4. Patient will demo increased wrist flexion AROM to 65 deg to improve reach, carrying, ADL completion. -Goal met at 72 (51 at last assess ) PLAN  [x]  Discharge due to:_goals met / progressing and program complete Muriel Sims, PT 1/30/2019

## 2021-12-14 ENCOUNTER — HOSPITAL ENCOUNTER (OUTPATIENT)
Dept: PHYSICAL THERAPY | Age: 81
Discharge: HOME | End: 2021-12-14
Payer: MEDICARE

## 2021-12-14 PROCEDURE — 97165 OT EVAL LOW COMPLEX 30 MIN: CPT

## 2021-12-14 PROCEDURE — 97018 PARAFFIN BATH THERAPY: CPT

## 2021-12-14 NOTE — PROGRESS NOTES
[address] [facility] PHYSICAL THERAPY  [address] [facility] [address], [place], [address] - Phone: [phone]  Fax: [phone] / 483 [facility]  Patient Name: Fab Shin : 1940   Medical   Diagnosis: Left hand pain [M79.642]  Right hand pain [M79.641] Treatment Diagnosis: Left hand pain/Arthritis    Onset Date:      Referral Source: David Rosa MD [facility]): 2021   Prior Hospitalization: See medical history Provider #: [id number]   Prior Level of Function: Cooking, home management, cleaning, working out (everyday for one hour), walking    Comorbidities: Osteoporosis, Arthritis, Acid Reflux    Medications: Verified on Patient Summary List   The Plan of Care and following information is based on the information from the initial evaluation.   ===========================================================================================  Assessment / key information:  Pt is a 81 y/o male who is being seen for occupational therapy services due to right and left hand pain. Patient has a hx of arthritis which causes her to have functional deficits at times when she is completing her ADLs/IADLs. Patients onset of injury occurred in  when she was completing a workout in the gym at [facility] and fell off of the bosu ball onto her left wrist. This injury resulted in a fracture of her left wrist. The patient was cased and put in a splint for approximately two months. Recently, in 2021, the patient tripped and fell onto her left side while she was walking on the sidewalk in \[facility] [place] [facility]". This incident did not result in any fx's. After this incident, she was seen by an orthopedic doctor in \[place] [facility] [place] [facility]\"" who suggested that she should receive occupational therapy. Patient reports pain at a level of 2/10. Patient reports soreness in her left hand.   Patient explains that she has been massaging her hand to aid in pain relief at times. Patient explains that she has increased difficulty with holding heavy objects with her left hand. Upon palpation and pt report, pt presents with a hard induration on the radial aspect of her left thumb below the MP joint. This induration could be a result of a nodule from ongoing arthritis. Rom: Pt presents with decreased left wrist flexion at 45 degrees (right: 60 degrees). Pt presents with decreased left wrist extension at 50 degrees (right: 60 degrees). Pt presents with pain in the left wrist at the level of the ulnar styloid when completing ulnar deviation. 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 A gap that is approximately 1cm in width is present between the left ring and small finger. This may be due to an arthritic deformity. Pt reports that she is currently using a long handled sponge to reach her bath during bathing activities. Pt presents with decreased left hand three point pinch strength at 5# (7# right). Pt would benefit from skilled occupational therapy services to increase ROM, strength, practice joint protective strategies, and increase independence in ADL/IADL activities. FOTO score: 76  ===========================================================================================  Eval Complexity: History: LOW Complexity : Brief history review ; Examination: LOW Complexity : 1-3 performance deficits relating to physical, cognitive , or psychosocial skils that result in activity limitations and / or participation restrictions ; Decision Making:MEDIUM Complexity : Patient may present with comorbidities that affect occupational performnce.  Miniml to moderate modification of tasks or assistance (eg, physical or verbal ) with assesment(s) is necessary to enable patient to complete evaluation   Problem List: Pain effecting function, Decreased range of motion, Decreased strength, Decreased ADL/functional abilities  and Decreased flexibility/joint mobility   Treatment Plan may include any combination of the following: Therapeutic exercise, Therapeutic activities, Patient education, ADLs/IADLs and Other  Patient / Family readiness to learn indicated by: asking questions, trying to perform skills and interest  Persons(s) to be included in education: patient (P)  Barriers to Learning/Limitations: None  Measures taken:    Patient Goal (s): Improve mobility, strengthening the hand    Patient self reported health status: good  Rehabilitation Potential: good   Short Term Goals: To be accomplished in 2  weeks:    1. Pt will be independent in home exercise program for wrist AROM exercises to improve wrist function and overall range of motion. 2. Pt will be independent in demonstrating and practicing joint protection strategies in order to increase function in home management tasks. 3. Pt will be familiar with using heat modalities before and/or after exercises to aid in relief of symptoms.  Long Term Goals: To be accomplished in 4  weeks:  1. Pt will increase left hand three point pinch strength by 5 pounds in order to increase function in daily activities. 2. Pt will increase left wrist flexion by 10 degrees or greater to increase function in cooking tasks. 3. Pt will increase left wrist extension by 10 degrees or greater to increase function in cooking tasks. Frequency / Duration:   Patient to be seen 2  times per week for 4  weeks:  Patient / Caregiver education and instruction: activity modification and exercises    Therapist Signature: KEIRY See Date: 56/15/4529   Certification Period: 12/14/21 - 1/13/22 Time: 9:04 AM   ===========================================================================================  I certify that the above Occupational Therapy Services are being furnished while the patient is under my care. I agree with the treatment plan and certify that this therapy is necessary.     Physician Signature:        Date:       Time:     Please sign and return to [facility] or you may fax the signed copy to [phone]. Thank you.                                        Corazon Medina MD

## 2021-12-14 NOTE — PROGRESS NOTES
OCCUPATIONAL THERAPY - DAILY TREATMENT NOTE    Patient Name: Erick Savage        Date: 2021  : 1940   YES Patient  Verified  Visit #:     Insurance: Payor: Sami Flores / Plan: [place] MED[place] PART A & B / Product Type: Medicare /      In time: 11:45 Out time: 12:30   Total Treatment Time: 45     Medicare/BCBS Time Tracking (below)   Total Timed Codes (min): 8 1:1 Treatment Time:  8     TREATMENT AREA =  L UE    SUBJECTIVE    Pain Level (on 0 to 10 scale):  2  / 10   Medication Changes/New allergies or changes in medical history, any new surgeries or procedures? NO    If yes, update Summary List   Subjective Functional Status/Changes:  []  No changes reported     \"It is sometimes difficult for me to hold heavy objects with my L hand. \"          OBJECTIVE    Modalities Rationale:     decrease edema and decrease pain to improve patient's ability to  and pinch for ADL participation.    min [] Estim, type/location:                                      []  att     []  unatt     []  w/US     []  w/ice    []  w/heat    min []  Ultrasound, settings/location:      min []  Iontophoresis w/ dexamethasone, location:                                               []  take home patch       []  in clinic    min []  Ice     []  Heat    location/position:    8 min [x]  Paraffin:  location L hand     min []  Vasopneumatic Device, press/temp:     min []  Other:    [x] Skin assessment post-treatment (if applicable):    [x]  intact    []  redness- no adverse reaction     []redness  adverse reaction:        37 min Eval:          min Patient Education:  YES  Reviewed HEP   [x]  Progressed/Changed HEP based on:   Reviewed HEP wrist AROM      Other Objective/Functional Measures:  Edema: Circumference    Right  Left   Styolid Level  15.2cm  15.7cm    Range of Motion     Active Passive     Norms Right Left Right Left                                                                                    Wrist Flex 0-80 60 45      Ext 0-70 60 50      Ulnar Dev 0-30 30 30 *pain on L ulnar styloid     Radial Dev 0-20 20 20       Hand Strength:   Gross Grasp 3pt Pinch Lateral Pinch Tip Pinch   Right  25 7 7 7   Left 25 (*pain) 5 8 6     Nine-Hole Peg Test:  Left= __29___seconds  Right=__21___seconds  Finger Opposition: WNL      Palpation: Hard induration present below the MP joint of the left thumb. ADLs  Feeding:        []MaxA   []ModA   []Ashu   [] CGA   []SBA   []Vero   [x]Independent  UE Dressing:       []MaxA   []ModA   []Ashu   [] CGA   []SBA   []Vero   [x]Independent  LE Dressing:       []MaxA   []ModA   []Ashu   [] CGA   []SBA   []Vero   [x]Independent  Grooming:       []MaxA   []ModA   []Ashu   [] CGA   []SBA   []Vero   [x]Independent  Toileting:       []MaxA   []ModA   []Ashu   [] CGA   []SBA   []Vero   [x]Independent  Bathing:       []MaxA   []ModA   []Ashu   [] CGA   []SBA   [x]Vero   []Independent  Light Meal Prep:    []MaxA   []ModA   []Ashu   [] CGA   []SBA   []Vero   [x]Independent  Household/Other: []MaxA   []ModA   []Ashu   [] CGA   []SBA   []Vero   [x]Independent  Adaptive Equip:     []MaxA   []ModA   []Ashu   [] CGA   []SBA   [x]Vero   []Independent  Driving:       []MaxA   []ModA   []Ashu   [] CGA   []SBA   []Vero   [x]Independent  Money Mgmt:        []MaxA   []ModA   []Ashu   [] CGA   []SBA   []Vero   [x]Independent         Post Treatment Pain Level (on 0 to 10) scale:   0 / 10     ASSESSMENT  Assessment/Changes in Function:     Pt is a 81 y/o male who is being seen for occupational therapy services due to right and left hand pain. Patient has a hx of arthritis which causes her to have functional deficits at times when she is completing her ADLs/IADLs.   Patients onset of injury occurred in 2018 when she was completing a workout in the gym at [facility] and fell off of the bosu ball onto her left wrist. This injury resulted in a fracture of her left wrist. The patient was cased and put in a splint for approximately two months. Recently, in August of 2021, the patient tripped and fell onto her left side while she was walking on the sidewalk in [facility]. This incident did not result in any fx's. After this incident, she was seen by an orthopedic doctor in [facility] who suggested that she should receive occupational therapy. Patient reports pain at a level of 2/10. Patient reports soreness in her left hand. Patient explains that she has been massaging her hand to aid in pain relief at times. Patient explains that she has increased difficulty with holding heavy objects with her left hand. Upon palpation and pt report, pt presents with a hard induration on the radial aspect of her left thumb below the MP joint. This induration could be a result of a nodule from ongoing arthritis. Rom: Pt presents with decreased left wrist flexion at 45 degrees (right: 60 degrees). Pt presents with decreased left wrist extension at 50 degrees (right: 60 degrees). Pt presents with pain in the left wrist at the level of the ulnar styloid when completing ulnar deviation. 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 A gap that is approximately 1cm in width is present between the left ring and small finger. This may be due to an arthritic deformity. Pt reports that she is currently using a long handled sponge to reach her bath during bathing activities. Pt presents with decreased left hand three point pinch strength at 5# (7# right). Pt would benefit from skilled occupational therapy services to increase ROM, strength, practice joint protective strategies, and increase independence in ADL/IADL activities. LOW Complexity : Brief history review ; Examination: LOW Complexity : 1-3 performance deficits relating to physical, cognitive , or psychosocial skils that result in activity limitations and / or participation restrictions ;  Decision Making:MEDIUM Complexity : Patient may present with comorbidities that affect occupational performnce. Miniml to moderate modification of tasks or assistance (eg, physical or verbal ) with assesment(s) is necessary to enable patient to complete evaluation             []  See Progress Note/Recertification   Patient will continue to benefit from skilled OT services to address ROM deficits, address strength deficits, analyze and address soft tissue restrictions and analyze and modify body mechanics/ergonomics to attain remaining goals. Progress toward goals / Updated goals:    1. Pt will be independent in home exercise program for wrist AROM exercises to improve wrist function and overall range of motion. 2. Pt will be independent in demonstrating and practicing joint protection strategies in order to increase function in home management tasks. 3. Pt will be familiar with using heat modalities before and/or after exercises to aid in relief of symptoms. 4. Pt will increase left hand three point pinch strength by 5 pounds in order to increase function in daily activities. 5. Pt will increase left wrist flexion by 10 degrees or greater to increase function in cooking tasks. 6. Pt will increase left wrist extension by 10 degrees or greater to increase function in cooking tasks.         PLAN  []  Upgrade activities as tolerated YES Continue plan of care   []  Discharge due to :    []  Other:      Therapist: KEIRY Man    Date: 12/14/2021 Time: 9:05 AM

## 2021-12-16 ENCOUNTER — HOSPITAL ENCOUNTER (OUTPATIENT)
Dept: PHYSICAL THERAPY | Age: 81
Discharge: HOME | End: 2021-12-16
Payer: MEDICARE

## 2021-12-16 PROCEDURE — 97018 PARAFFIN BATH THERAPY: CPT

## 2021-12-16 PROCEDURE — 97110 THERAPEUTIC EXERCISES: CPT

## 2021-12-16 NOTE — PROGRESS NOTES
OCCUPATIONAL THERAPY - DAILY TREATMENT NOTE    Patient Name: Vania Escobedo        Date: 2021  : 1940   YES Patient  Verified  Visit #:   2   of   8  Insurance: Payor: Mora Winters / Plan: [place] MEDICARE PART A & B / Product Type: Medicare /      In time: 9:25 Out time: 10:10   Total Treatment Time: 45     Medicare/BCBS Time Tracking (below)   Total Timed Codes (min):  37 1:1 Treatment Time:  37     TREATMENT AREA =  Left  UE    SUBJECTIVE    Pain Level (on 0 to 10 scale):  2 / 10   Medication Changes/New allergies or changes in medical history, any new surgeries or procedures? NO    If yes, update Summary List   Subjective Functional Status/Changes:  []  No changes reported     \"I have been doing my exercises at home. \"        OBJECTIVE    Modalities Rationale:     decrease edema and decrease pain to improve patient's ability to  and pinch for ADL participation.     min [] Estim, type/location:                                      []  att     []  unatt     []  w/US     []  w/ice    []  w/heat    min []  Ultrasound, settings/location:      min []  Iontophoresis w/ dexamethasone, location:                                               []  take home patch       []  in clinic    min []  Ice     []  Heat    location/position:    8 min [x]  Paraffin:  location Left hand     min []  Vasopneumatic Device, press/temp:     min []  Other:    [x] Skin assessment post-treatment (if applicable):    [x]  intact    []  redness- no adverse reaction     []redness  adverse reaction:        29 min Therapeutic Exercise:  [x]  See flow sheet   Rationale:       increase ROM and increase strength to improve the patients ability to  and pinch for ADL participation.     -see flow sheet     8 min Therapeutic Activity:    Rationale:    increase ROM and increase strength to improve the patients ability to improve the patient's ability to  and pinch for ADL participation,     -see flow sheet  - \" peg board min Patient Education:  YES  Reviewed HEP   [x]  Progressed/Changed HEP based on: Added tendon glide exercise HEP  Reviewed wrist AROM HEP     Other Objective/Functional Measures:  -Increased pain with wrist radial deviation   -Min verbal and visual cues for tendon glide exercises   -Numbness present on dorsal aspect of left wrist during strengthening exercises      Post Treatment Pain Level (on 0 to 10) scale:   2  / 10     ASSESSMENT  Assessment/Changes in Function:   -Pain is affecting function in daily activities  -Increased difficulty using intrinsic hand muscles specifically in the left ring finger and small finger during fine motor strengthening tasks. []  See Progress Note/Recertification   Patient will continue to benefit from skilled OT services to modify and progress therapeutic interventions, address ROM deficits, address strength deficits, analyze and modify body mechanics/ergonomics and instruct in home and community integration to attain remaining goals. Progress toward goals / Updated goals:  1. Pt will be independent in home exercise program for wrist AROM exercises to improve wrist function and overall range of motion. 12/16/21: Verónica Sharpe for tendon glide exercises  2. Pt will be independent in demonstrating and practicing joint protection strategies in order to increase function in home management tasks. 3. Pt will be familiar with using heat modalities before and/or after exercises to aid in relief of symptoms. 4. Pt will increase left hand three point pinch strength by 5 pounds in order to increase function in daily activities. 5. Pt will increase left wrist flexion by 10 degrees or greater to increase function in cooking tasks. 6. Pt will increase left wrist extension by 10 degrees or greater to increase function in cooking tasks.       PLAN  []  Upgrade activities as tolerated YES Continue plan of care   []  Discharge due to :    []  Other:      Therapist: Meliton Coreas OTR/L    Date: 12/16/2021 Time: 8:20 AM     Future Appointments   Date Time Provider Bob Pollard   12/16/2021  9:30 AM [facility] OT  [address] [facility]   12/21/2021  9:30 AM [facility] OT  [address] [facility]   12/23/2021  9:30 AM [facility] OT  [address] [facility]   12/29/2021 11:00 AM [facility] OT  [address] [facility]   1/4/2022  9:30 AM [facility] OT  [address] [facility]   1/6/2022  9:30 AM [facility] [place] OT  [address] [facility]   1/11/2022  9:30 AM [facility] OT  [address] [facility]   1/13/2022  9:30 AM [facility] OT  [address] [facility]   1/18/2022  9:30 AM [facility] [place] OT  [address] [facility]   1/20/2022  9:30 AM [facility] OT  [address] [facility]   1/25/2022  9:30 AM [facility] OT  [address] [facility]   1[facility]27/2022  9:30 AM [facility] OT  [address] [facility]

## 2021-12-21 ENCOUNTER — APPOINTMENT (OUTPATIENT)
Dept: PHYSICAL THERAPY | Age: 81
End: 2021-12-21
Payer: MEDICARE

## 2021-12-23 ENCOUNTER — APPOINTMENT (OUTPATIENT)
Dept: PHYSICAL THERAPY | Age: 81
End: 2021-12-23
Payer: MEDICARE

## 2021-12-29 ENCOUNTER — HOSPITAL ENCOUNTER (OUTPATIENT)
Dept: PHYSICAL THERAPY | Age: 81
Discharge: HOME | End: 2021-12-29
Payer: MEDICARE

## 2021-12-29 PROCEDURE — 97110 THERAPEUTIC EXERCISES: CPT

## 2021-12-29 PROCEDURE — 97018 PARAFFIN BATH THERAPY: CPT

## 2021-12-29 NOTE — PROGRESS NOTES
OCCUPATIONAL THERAPY - DAILY TREATMENT NOTE    Patient Name: Char Blum        Date: 2021  : 1940   YES Patient  Verified  Visit #:   3   of   8  Insurance: Payor: Joshua Memory / Plan: [place] MEDICARE PART A & B / Product Type: Medicare /      In time: 2:03 Out time: 2:45   Total Treatment Time: 42     Medicare/BCBS Time Tracking (below)   Total Timed Codes (min): 34 1:1 Treatment Time:  34     TREATMENT AREA =  Left UE   SUBJECTIVE    Pain Level (on 0 to 10 scale):  2 / 10   Medication Changes/New allergies or changes in medical history, any new surgeries or procedures? NO    If yes, update Summary List   Subjective Functional Status/Changes:  []  No changes reported     \"I am doing well today. \"         OBJECTIVE    Modalities Rationale:     decrease inflammation and decrease pain to improve patient's ability to  and pinch for ADL participation.      min [] Estim, type/location:                                      []  att     []  unatt     []  w/US     []  w/ice    []  w/heat    min []  Ultrasound, settings/location:      min []  Iontophoresis w/ dexamethasone, location:                                               []  take home patch       []  in clinic    min []  Ice     []  Heat    location/position:    8 min [x]  Paraffin:  location Left hand     min []  Vasopneumatic Device, press/temp:     min []  Other:    [x] Skin assessment post-treatment (if applicable):    [x]  intact    []  redness- no adverse reaction     []redness  adverse reaction:        34 min Therapeutic Exercise:  [x]  See flow sheet   Rationale:      increase ROM and increase strength to improve the patients ability to  and pinch for ADL participation.      -see flow sheet        min Patient Education:  YES  Reviewed HEP   []  Progressed/Changed HEP based on:   Reviewed joint protection strategies   Reviewed theraputty exercises for HEP     Other Objective/Functional Measures:    Min visual cues for tendon glide exercises  Min A with light resistive exercises for tip pinch   Min A with theraputty exercises (soft and medium soft)     Post Treatment Pain Level (on 0 to 10) scale:   0  / 10     ASSESSMENT  Assessment/Changes in Function:     Decreased ROM and strength continue to affect function in ADLs/IADLs. []  See Progress Note/Recertification   Patient will continue to benefit from skilled OT services to modify and progress therapeutic interventions, address ROM deficits, address strength deficits, analyze and modify body mechanics/ergonomics and instruct in home and community integration to attain remaining goals. Progress toward goals / Updated goals:    1. Pt will be independent in home exercise program for wrist AROM exercises to improve wrist function and overall range of motion. 12/16/21: Kieran Gallus for tendon glide exercises  12/29/21: Min visual cues for tendon glide exercises    2. Pt will be independent in demonstrating and practicing joint protection strategies in order to increase function in home management tasks. 12/29/21: Introduced joint protection strategies    3. Pt will be familiar with using heat modalities before and/or after exercises to aid in relief of symptoms.      4. Pt will increase left hand three point pinch strength by 5 pounds in order to increase function in daily activities. 5. Pt will increase left wrist flexion by 10 degrees or greater to increase function in cooking tasks.      6. Pt will increase left wrist extension by 10 degrees or greater to increase function in cooking tasks.        PLAN  []  Upgrade activities as tolerated YES Continue plan of care   []  Discharge due to :    []  Other:      Therapist: KEIRY Noble    Date: 12/29/2021 Time: 11:18 AM     Future Appointments   Date Time Provider Bob Pollard   12/29/2021  2:00 PM [facility] OT  [address] [facility]   1/4/2022  9:30 AM [facility] OT  [address] [facility]   1/6/2022  9:30 AM [facility] OT AT [address] [facility]   1/11/2022  9:30 AM [facility] OT  [address] [facility]   1/13/2022  9:30 AM [facility] OT  [address] [facility]   1/18/2022  9:30 AM [facility] OT  [address] [facility]   1/20/2022  9:30 AM [facility] OT  [address] [facility]   1/25/2022  9:30 AM [facility] OT  [address] [facility]   1/27/2022  9:30 AM [facility] OT  [address] [facility]

## 2022-01-04 ENCOUNTER — APPOINTMENT (OUTPATIENT)
Dept: PHYSICAL THERAPY | Age: 82
End: 2022-01-04
Payer: MEDICARE

## 2022-01-06 ENCOUNTER — APPOINTMENT (OUTPATIENT)
Dept: PHYSICAL THERAPY | Age: 82
End: 2022-01-06
Payer: MEDICARE

## 2022-01-11 ENCOUNTER — HOSPITAL ENCOUNTER (OUTPATIENT)
Dept: PHYSICAL THERAPY | Age: 82
Discharge: HOME | End: 2022-01-11
Payer: MEDICARE

## 2022-01-11 PROCEDURE — 97110 THERAPEUTIC EXERCISES: CPT

## 2022-01-11 PROCEDURE — 97018 PARAFFIN BATH THERAPY: CPT

## 2022-01-11 NOTE — PROGRESS NOTES
OCCUPATIONAL THERAPY - DAILY TREATMENT NOTE    Patient Name: Joycelyn Miller        Date: 2022  : 1940   YES Patient  Verified  Visit #:   4   of   8  Insurance: Payor: Karlo Dang / Plan: [place] MEDICARE PART A & B / Product Type: Medicare /      In time: 9:25 Out time: 10:10   Total Treatment Time: 45     Medicare/BCBS Time Tracking (below)   Total Timed Codes (min):  37 1:1 Treatment Time:  37     TREATMENT AREA =  Left hand    SUBJECTIVE    Pain Level (on 0 to 10 scale):  0  / 10   Medication Changes/New allergies or changes in medical history, any new surgeries or procedures? NO    If yes, update Summary List   Subjective Functional Status/Changes:  []  No changes reported     \"I have been doing my exercises at home. \"         OBJECTIVE    Modalities Rationale:     decrease inflammation, decrease pain and increase tissue extensibility to improve patient's ability to  and pinch for ADL participation. min [] Estim, type/location:                                      []  att     []  unatt     []  w/US     []  w/ice    []  w/heat    min []  Ultrasound, settings/location:      min []  Iontophoresis w/ dexamethasone, location:                                               []  take home patch       []  in clinic    min []  Ice     []  Heat    location/position:    8 min [x]  Paraffin:  location Left hand    min []  Vasopneumatic Device, press/temp:     min []  Other:    [x] Skin assessment post-treatment (if applicable):    [x]  intact    []  redness- no adverse reaction     []redness  adverse reaction:        37 min Therapeutic Exercise:  [x]  See flow sheet   Rationale:      increase ROM and increase strength to improve the patients ability to  and pinch for ADL participation.       -recheck   -see flow sheet        min Patient Education:  Eunice Lujan   []  Progressed/Changed HEP based on:   Pt ed on using heat modalities before HEP     Other Objective/Functional Measures:    Range of Motion                Active       Norms Right Left 1/11/22  Left   Wrist Flex 0-80 60 45  58 (+13) (with pain, arthritis)     Ext 0-70 60 50  55 (+5)      Hand Strength:      3pt Pinch   Right  7   Left 5   1/11/22  Left 6 (+1)           Post Treatment Pain Level (on 0 to 10) scale:  3  / 10     ASSESSMENT  Assessment/Changes in Function:     Pt presents with increased left wrist flexion (+13). Pt presents with increased left wrist extension (+5). Pt presents with increased left hand three point pinch (+1). Pt is continuing to make substantial progress in therapy. []  See Progress Note/Recertification   Patient will continue to benefit from skilled OT services to modify and progress therapeutic interventions, address ROM deficits, address strength deficits, analyze and modify body mechanics/ergonomics and instruct in home and community integration to attain remaining goals. Progress toward goals / Updated goals:    1. Pt will be independent in home exercise program for wrist AROM exercises to improve wrist function and overall range of motion.   12/16/21: Jennifer Schultz for tendon glide exercises  12/29/21: Min visual cues for tendon glide exercises  Status at PN: Min verbal and visual cues for tendon glide and wrist AROM exercises. Goal not met.      2. Pt will be independent in demonstrating and practicing joint protection strategies in order to increase function in home management tasks. 12/29/21: Introduced joint protection strategies  Status at PN: NT     3. Pt will be familiar with using heat modalities before and/or after exercises to aid in relief of symptoms.   Status at PN: Pt reports not using heat at home. Pt ed on importance of using heat before exercises and to aid in pain relief. Goal not met.      4. Pt will increase left hand three point pinch strength by 5 pounds in order to increase function in daily activities.   Status at PN: 6 (+1). Progressing, goal not met.      5.  Pt will increase left wrist flexion by 10 degrees or greater to increase function in cooking tasks.   Status at PN: 58 (+13). Goal met.      6. Pt will increase left wrist extension by 10 degrees or greater to increase function in cooking tasks.   Status at PN: 55 (+5). Progressing, goal not met.       PLAN  []  Upgrade activities as tolerated YES Continue plan of care   []  Discharge due to :    []  Other:      Therapist: KEIRY Leon    Date: 1/11/2022 Time: 8:21 AM     Future Appointments   Date Time Provider Bob Pollard   1/11/2022  9:30 AM Girish Blanks, OT [facility]   1/13/2022  9:30 AM Girish Blanks, OT [facility]   1/18/2022  9:30 AM Girish Blanks, OT [facility]   1/20/2022  9:30 AM Girish Blanks, OT [facility]   1/25/2022  9:30 AM Girish Blanks, OT [facility]   1/27/2022  9:30 AM Girish Blanks, OT [facility]

## 2022-01-11 NOTE — PROGRESS NOTES
[address] [facility] PHYSICAL THERAPY  [address] [facility], [address] - Phone: [phone]  Fax: [phone]  Progress Note/RECERTIFICATION FOR OCCUPATIONAL THERAPY          Patient Name: Varun Mehta : 1940   Medical/Treatment Diagnosis: Left hand pain [M79.642]  Right hand pain [M79.641]   Onset Date:     Referral Source: Jeancarlos Ley MD Start of Care [facility]): 2021   Prior Hospitalization: See Medical History Provider #: [id number]   Prior Level of Function: Cooking, home management, cleaning, working out (everyday for one hour), walking    Comorbidities: Osteoporosis, Arthritis, Acid Reflux    Medications: Verified on Patient Summary List   Visits from [facility]: 4 Missed Visits: 1     Goal/Measure of Progress Goal Met? 1. Pt will be independent in home exercise program for wrist AROM exercises to improve wrist function and overall range of motion.    Status at last Eval: Min visual cues for tendon glide exercises Current Status: Min verbal and visual cues for tendon glide and wrist AROM exercises.     no   2. Pt will be independent in demonstrating and practicing joint protection strategies in order to increase function in home management tasks. Status at last Eval: Introduced joint protection strategies Current Status: NT no   3. Pt will be familiar with using heat modalities before and/or after exercises to aid in relief of symptoms.    Status at last Eval: NT Current Status: Pt reports not using heat at home. Pt ed on importance of using heat before exercises and to aid in pain relief. no   4. Pt will increase left hand three point pinch strength by 5 pounds in order to increase function in daily activities.    Status at last Eval: 5 Current Status:  6 (+1). no     5. Pt will increase left wrist flexion by 10 degrees or greater to increase function in cooking tasks.    Status at last Eval: 45 Current Status: 58 (+13). yes     6.   Pt will increase left wrist extension by 10 degrees or greater to increase function in cooking tasks.    Status at last Eval: 50 Current Status: 55 (+5). no       Key Functional Changes/Progress: Pt presents with increased left wrist flexion (+13). Pt presents with increased left wrist extension (+5). Pt presents with increased left hand three point pinch (+1). Pt is continuing to make substantial progress in therapy. Problem List: decrease ROM, decrease strength and decrease flexibility/ joint mobility   Treatment Plan may include any combination of the following: Therapeutic exercise, Therapeutic activities, Physical agent/modality, Patient education, ADLs/IADLs and Other  Patient Goal(s) has been updated and includes:        Goals for this certification period include and are to be achieved in   4  weeks:      1. Pt will be independent in home exercise program for wrist AROM exercises to improve wrist function and overall range of motion.   Status at PN: Min verbal and visual cues for tendon glide and wrist AROM exercises. Goal not met.      2. Pt will be independent in demonstrating and practicing joint protection strategies in order to increase function in home management tasks.   Status at PN: NT     3. Pt will be familiar with using heat modalities before and/or after exercises to aid in relief of symptoms.   Status at PN: Pt reports not using heat at home. Pt ed on importance of using heat before exercises and to aid in pain relief. Goal not met.      4. Pt will increase left hand three point pinch strength by 5 pounds in order to increase function in daily activities.   Status at PN: 6 (+1). Progressing, goal not met.      5. Pt will increase left wrist extension by 10 degrees or greater to increase function in cooking tasks.   Status at PN: 55 (+5). Progressing, goal not met.        Frequency / Duration:   Patient to be seen   1-2   times per week for   4    weeks:    Assessments/Recommendations: Pt would continue to benefit from skilled occupational therapy services to increase ROM and strength in order to improve participation in ADLs/IADLs. If you have any questions/comments please contact us directly at [phone]. Thank you for allowing us to assist in the care of your patient. Therapist Signature: SUKHDEV King/ERICK Date: 8/86/2810   Certification Period:  Reporting Period: 1/11/2022 - 4/11/2022 12/14/2021 - 1/11/2022 Time: 8:22 AM   NOTE TO PHYSICIAN:  PLEASE COMPLETE THE ORDERS BELOW AND FAX TO   [facility] [place] Physical [facility]: [phone] If you are unable to process this request in 24 hours please contact our office: [phone].    ___ I have read the above report and request that my patient continue as recommended.   ___ I have read the above report and request that my patient continue therapy with the following changes/special instructions: ________________________________________________   ___ I have read the above report and request that my patient be discharged from therapy.      Physician Signature:        Date:       Time:                                        Daniel Lott MD

## 2022-01-13 ENCOUNTER — HOSPITAL ENCOUNTER (OUTPATIENT)
Dept: PHYSICAL THERAPY | Age: 82
Discharge: HOME | End: 2022-01-13
Payer: MEDICARE

## 2022-01-13 PROCEDURE — 97110 THERAPEUTIC EXERCISES: CPT

## 2022-01-13 PROCEDURE — 97018 PARAFFIN BATH THERAPY: CPT

## 2022-01-13 NOTE — PROGRESS NOTES
OCCUPATIONAL THERAPY - DAILY TREATMENT NOTE    Patient Name: Kia Cooper        Date: 2022  : 1940   YES Patient  Verified  Visit #:   5   of   8  Insurance: Payor: Nghia Garcia / Plan: [place] / Product Type: Medicare /      In time: 9:32 Out time: 10:15   Total Treatment Time: 43     Medicare/BCBS Time Tracking (below)   Total Timed Codes (min):  35 1:1 Treatment Time:  35     TREATMENT AREA =  Left hand     SUBJECTIVE    Pain Level (on 0 to 10 scale):  1-2  / 10   Medication Changes/New allergies or changes in medical history, any new surgeries or procedures? NO    If yes, update Summary List   Subjective Functional Status/Changes:  []  No changes reported     \"I do not have much pain today. \"         OBJECTIVE    Modalities Rationale:     decrease inflammation, decrease pain and increase tissue extensibility to improve patient's ability to  and pinch for ADL participation. min [] Estim, type/location:                                      []  att     []  unatt     []  w/US     []  w/ice    []  w/heat    min []  Ultrasound, settings/location:      min []  Iontophoresis w/ dexamethasone, location:                                               []  take home patch       []  in clinic    min []  Ice     []  Heat    location/position:    8 min [x]  Paraffin:  location Left hand     min []  Vasopneumatic Device, press/temp:     min []  Other:    [x] Skin assessment post-treatment (if applicable):    [x]  intact    []  redness- no adverse reaction     []redness  adverse reaction:        35 min Therapeutic Exercise:  [x]  See flow sheet   Rationale:      increase ROM and increase strength to improve the patients ability to  and pinch for ADL participation    -see flow sheet      min Patient Education:  NO  Reviewed HEP   []  Progressed/Changed HEP based on:         Other Objective/Functional Measures:    Min visual cues for tendon glide exercises   Min A three point pinch for 4# clips   Tolerated medium-soft putty exercises      Post Treatment Pain Level (on 0 to 10) scale:   1-2  / 10     ASSESSMENT  Assessment/Changes in Function:     Weakness and decreased ROM continue to affect functioning in ADLs/IADLs. Patient is continuing to make progress towards goals. []  See Progress Note/Recertification   Patient will continue to benefit from skilled OT services to modify and progress therapeutic interventions, address ROM deficits, address strength deficits, analyze and modify body mechanics/ergonomics and instruct in home and community integration to attain remaining goals. Progress toward goals / Updated goals:    1. Pt will be independent in home exercise program for wrist AROM exercises to improve wrist function and overall range of motion.   12/16/21: Kathey Solum for tendon glide exercises  12/29/21: Min visual cues for tendon glide exercises  Status at PN: Min verbal and visual cues for tendon glide and wrist AROM exercises. Goal not met.      2. Pt will be independent in demonstrating and practicing joint protection strategies in order to increase function in home management tasks.   12/29/21: Introduced joint protection strategies  Status at PN: NT     3. Pt will be familiar with using heat modalities before and/or after exercises to aid in relief of symptoms.   Status at PN: Pt reports not using heat at home. Pt ed on importance of using heat before exercises and to aid in pain relief. Goal not met.      4. Pt will increase left hand three point pinch strength by 5 pounds in order to increase function in daily activities.   Status at PN: 6 (+1). Progressing, goal not met.      5. Pt will increase left wrist extension by 10 degrees or greater to increase function in cooking tasks.   Status at PN: 55 (+5). Progressing, goal not met.       PLAN  []  Upgrade activities as tolerated YES Continue plan of care   []  Discharge due to :    []  Other:      Therapist: Inocencia Guy, OTR/L    Date: 1/13/2022 Time: 9:41 AM     Future Appointments   Date Time Provider Bob Pollard   1/18/2022  9:30 AM Mingo Muhammad OT [facility]   1/20/2022  9:30 AM Mingo Muhammad OT [facility]   1/25/2022  9:30 AM Mingo Muhammad OT [facility]   1/27/2022  9:30 AM Mingo Muhammad OT [facility]

## 2022-01-18 ENCOUNTER — HOSPITAL ENCOUNTER (OUTPATIENT)
Dept: PHYSICAL THERAPY | Age: 82
Discharge: HOME | End: 2022-01-18
Payer: MEDICARE

## 2022-01-18 PROCEDURE — 97110 THERAPEUTIC EXERCISES: CPT

## 2022-01-18 PROCEDURE — 97018 PARAFFIN BATH THERAPY: CPT

## 2022-01-18 NOTE — PROGRESS NOTES
OCCUPATIONAL THERAPY - DAILY TREATMENT NOTE    Patient Name: Kerline Raymundo        Date: 2022  : 1940   YES Patient  Verified  Visit #:   6   of   8  Insurance: Payor: Evelia Leon / Plan: [place] MEDICARE PART A & B / Product Type: Medicare /      In time: 9:30 Out time: 10:11   Total Treatment Time: 41     Medicare/BCBS Time Tracking (below)   Total Timed Codes (min):  33 1:1 Treatment Time:  33     TREATMENT AREA = Left hand     SUBJECTIVE    Pain Level (on 0 to 10 scale):  0  / 10   Medication Changes/New allergies or changes in medical history, any new surgeries or procedures? NO    If yes, update Summary List   Subjective Functional Status/Changes:  []  No changes reported     \"I am not having a lot of trouble doing things just some pain. \"         OBJECTIVE    Modalities Rationale:     decrease inflammation and decrease pain to improve patient's ability to  and pinch for ADL participation. min [] Estim, type/location:                                      []  att     []  unatt     []  w/US     []  w/ice    []  w/heat    min []  Ultrasound, settings/location:      min []  Iontophoresis w/ dexamethasone, location:                                               []  take home patch       []  in clinic    min []  Ice     []  Heat    location/position:    8 min [x]  Paraffin:  location Left hand     min []  Vasopneumatic Device, press/temp:     min []  Other:    [x] Skin assessment post-treatment (if applicable):    [x]  intact    []  redness- no adverse reaction     []redness  adverse reaction:        33 min Therapeutic Exercise:  [x]  See flow sheet   Rationale:      increase ROM and increase strength to improve the patients ability to  and pinch for ADL participation.     -see flow sheet           min Patient Education:  NO  Reviewed HEP   []  Progressed/Changed HEP based on:         Other Objective/Functional Measures:    Min visual cues for tendon glide exercises   Mod A with 4# three point pinch for clip resistive exercises  Min A with gripper (yellow) for 1\" pegs      Post Treatment Pain Level (on 0 to 10) scale:   0  / 10     ASSESSMENT  Assessment/Changes in Function:     Weakness and decreased ROM continue to affect functioning in ADLs/IADLs. []  See Progress Note/Recertification   Patient will continue to benefit from skilled OT services to modify and progress therapeutic interventions, address ROM deficits, address strength deficits, analyze and modify body mechanics/ergonomics and instruct in home and community integration to attain remaining goals. Progress toward goals / Updated goals:    1. Pt will be independent in home exercise program for wrist AROM exercises to improve wrist function and overall range of motion.   12/16/21: Rox Offer for tendon glide exercises  12/29/21: Min visual cues for tendon glide exercises  Status at PN: Min verbal and visual cues for tendon glide and wrist AROM exercises. Goal not met.   1/18/22: Min visual cues for tendon glide exercises and wrist flexor stretch     2. Pt will be independent in demonstrating and practicing joint protection strategies in order to increase function in home management tasks.   12/29/21: Introduced joint protection strategies  Status at PN: NT     3. Pt will be familiar with using heat modalities before and/or after exercises to aid in relief of symptoms.   Status at PN: Pt reports not using heat at home. Pt ed on importance of using heat before exercises and to aid in pain relief. Goal not met.      4. Pt will increase left hand three point pinch strength by 5 pounds in order to increase function in daily activities.   Status at PN: 6 (+1). Progressing, goal not met.      5. Pt will increase left wrist extension by 10 degrees or greater to increase function in cooking tasks.   Status at PN: 55 (+5).  Progressing, goal not met.      PLAN  []  Upgrade activities as tolerated YES Continue plan of care   []  Discharge due to : []  Other:      Therapist: Jill Bence, OTR/ERICK    Date: 1/18/2022 Time: 8:15 AM     Future Appointments   Date Time Provider Bob Pollard   1/18/2022  9:30 AM Ariana Edmondson OT [facility]   1/20/2022  9:30 AM Ariana Edmondson OT [facility]   1/25/2022  9:30 AM Ariana Edmondson OT [facility]   1/27/2022  9:30 AM Ariana Edmondson OT [facility]

## 2022-01-20 ENCOUNTER — HOSPITAL ENCOUNTER (OUTPATIENT)
Dept: PHYSICAL THERAPY | Age: 82
Discharge: HOME | End: 2022-01-20
Payer: MEDICARE

## 2022-01-20 PROCEDURE — 97110 THERAPEUTIC EXERCISES: CPT

## 2022-01-20 PROCEDURE — 97018 PARAFFIN BATH THERAPY: CPT

## 2022-01-20 NOTE — PROGRESS NOTES
OCCUPATIONAL THERAPY - DAILY TREATMENT NOTE    Patient Name: Varun Mehta        Date: 2022  : 1940   YES Patient  Verified  Visit #:   3  of   8  Insurance: Payor: Ever Abbot / Plan: [place] MEDICARE PART A & B / Product Type: Medicare /      In time: 9:32 Out time: 10:15   Total Treatment Time: 43     Medicare/BCBS Time Tracking (below)   Total Timed Codes (min):  35 1:1 Treatment Time:  35     TREATMENT AREA =  L hand    SUBJECTIVE    Pain Level (on 0 to 10 scale):  0  / 10   Medication Changes/New allergies or changes in medical history, any new surgeries or procedures? NO    If yes, update Summary List   Subjective Functional Status/Changes:  []  No changes reported     \"I was able to place pressure on my hand when I was leaning on the table and I had not been able to do that for awhile. \"          OBJECTIVE    Modalities Rationale:     decrease inflammation, decrease pain and increase tissue extensibility to improve patient's ability to  and pinch for ADL participation. min [] Estim, type/location:                                      []  att     []  unatt     []  w/US     []  w/ice    []  w/heat    min []  Ultrasound, settings/location:      min []  Iontophoresis w/ dexamethasone, location:                                               []  take home patch       []  in clinic    min []  Ice     []  Heat    location/position:    8 min [x]  Paraffin:  location Left hand    min []  Vasopneumatic Device, press/temp:     min []  Other:    [x] Skin assessment post-treatment (if applicable):    [x]  intact    []  redness- no adverse reaction     []redness  adverse reaction:        35 min Therapeutic Exercise:  [x]  See flow sheet   Rationale:      increase ROM and increase strength to improve the patients ability to  and pinch for ADL participation.    -see flow sheet           min Patient Education:  NO  Reviewed HEP   []  Progressed/Changed HEP based on:         Other Objective/Functional Measures:    Min A three point pinch velcro blocks resistive exercise   Min A with gripper (yellow) resistive exercise   Mod A 6# clip for three point pinch    Post Treatment Pain Level (on 0 to 10) scale:   0  / 10     ASSESSMENT  Assessment/Changes in Function:     Decreased strength and ROM continue to affect participation in ADLs/IADLs. []  See Progress Note/Recertification   Patient will continue to benefit from skilled OT services to modify and progress therapeutic interventions, address ROM deficits, address strength deficits, analyze and modify body mechanics/ergonomics and instruct in home and community integration to attain remaining goals. Progress toward goals / Updated goals:    1. Pt will be independent in home exercise program for wrist AROM exercises to improve wrist function and overall range of motion.   12/16/21: Jackson Grovesum for tendon glide exercises  12/29/21: Min visual cues for tendon glide exercises  Status at PN: Min verbal and visual cues for tendon glide and wrist AROM exercises. Goal not met.   1/18/22: Min visual cues for tendon glide exercises and wrist flexor stretch     2. Pt will be independent in demonstrating and practicing joint protection strategies in order to increase function in home management tasks.   12/29/21: Introduced joint protection strategies  Status at PN: NT     3. Pt will be familiar with using heat modalities before and/or after exercises to aid in relief of symptoms.   Status at PN: Pt reports not using heat at home. Pt ed on importance of using heat before exercises and to aid in pain relief. Goal not met.      4. Pt will increase left hand three point pinch strength by 5 pounds in order to increase function in daily activities.   Status at PN: 6 (+1). Progressing, goal not met.   1/20/22: Min A with three point pinch resistive exercise. Mod A 6# clip three point pinch.        5.  Pt will increase left wrist extension by 10 degrees or greater to increase function in cooking tasks.   Status at PN: 55 (+5).  Progressing, goal not met.      PLAN  []  Upgrade activities as tolerated YES Continue plan of care   []  Discharge due to :    []  Other:      Therapist: Cooper Loera OTR/L    Date: 1/20/2022 Time: 8:14 AM     Future Appointments   Date Time Provider Bob Pollard   1/20/2022  9:30 AM Ana Galvez, OT [facility]   1/25/2022  9:30 AM Ternicole Galvez OT [facility]   1/27/2022  9:30 AM Ternicole Galvez, OT [facility]

## 2022-01-25 ENCOUNTER — HOSPITAL ENCOUNTER (OUTPATIENT)
Dept: PHYSICAL THERAPY | Age: 82
Discharge: HOME | End: 2022-01-25
Payer: MEDICARE

## 2022-01-25 PROCEDURE — 97018 PARAFFIN BATH THERAPY: CPT

## 2022-01-25 PROCEDURE — 97110 THERAPEUTIC EXERCISES: CPT

## 2022-01-25 NOTE — PROGRESS NOTES
OCCUPATIONAL THERAPY - DAILY TREATMENT NOTE    Patient Name: Daniela Host        Date: 2022  : 1940   YES Patient  Verified  Visit #:  4   of   8  Insurance: Payor: Ivonne Arredondo / Plan: [place] MEDICARE PART A & B / Product Type: Medicare /      In time: 9:27 Out time: 10:12   Total Treatment Time: 45     Medicare/BCBS Time Tracking (below)   Total Timed Codes (min):  37 1:1 Treatment Time:  37     TREATMENT AREA =  Left hand     SUBJECTIVE    Pain Level (on 0 to 10 scale):  0  / 10   Medication Changes/New allergies or changes in medical history, any new surgeries or procedures? NO    If yes, update Summary List   Subjective Functional Status/Changes:  []  No changes reported     \"I feel that I have made a lot of improvements. \"       OBJECTIVE    Modalities Rationale:     decrease inflammation, decrease pain and increase tissue extensibility to improve patient's ability to  and pinch for ADL participation.      min [] Estim, type/location:                                      []  att     []  unatt     []  w/US     []  w/ice    []  w/heat    min []  Ultrasound, settings/location:      min []  Iontophoresis w/ dexamethasone, location:                                               []  take home patch       []  in clinic    min []  Ice     []  Heat    location/position:    8 min [x]  Paraffin:  location Left hand     min []  Vasopneumatic Device, press/temp:     min []  Other:    [x] Skin assessment post-treatment (if applicable):    [x]  intact    []  redness- no adverse reaction     []redness  adverse reaction:        37 min Therapeutic Exercise:  [x]  See flow sheet   Rationale:      increase ROM and increase strength to improve the patients ability to  and pinch for ADL participation.     -see flow sheet          min Patient Education:  YES  Reviewed HEP   []  Progressed/Changed HEP based on:   Pt ed on home paraffin bath for arthritis   Upgraded putty to medium-soft and firm mix for HEP Other Objective/Functional Measures:    Min A three point pinch for 6# clip    Tolerated putty upgrade to medium-soft and firm mix  Min A 3# digiflex        Post Treatment Pain Level (on 0 to 10) scale:   0/ 10     ASSESSMENT  Assessment/Changes in Function:     Pt is  progressing towards all goals and is ready to be d/c to a HEP at the next visit. []  See Progress Note/Recertification   Patient will continue to benefit from skilled OT services to modify and progress therapeutic interventions, address ROM deficits, address strength deficits, analyze and modify body mechanics/ergonomics and instruct in home and community integration to attain remaining goals. Progress toward goals / Updated goals:     1. Pt will be independent in home exercise program for wrist AROM exercises to improve wrist function and overall range of motion.   12/16/21: Rosanne Fernandez for tendon glide exercises  12/29/21: Min visual cues for tendon glide exercises  Status at PN: Min verbal and visual cues for tendon glide and wrist AROM exercises. Goal not met.   1/18/22: Min visual cues for tendon glide exercises and wrist flexor stretch     2. Pt will be independent in demonstrating and practicing joint protection strategies in order to increase function in home management tasks.   12/29/21: Introduced joint protection strategies  Status at PN: NT     3. Pt will be familiar with using heat modalities before and/or after exercises to aid in relief of symptoms.   Status at PN: Pt reports not using heat at home. Pt ed on importance of using heat before exercises and to aid in pain relief. Goal not met.      4. Pt will increase left hand three point pinch strength by 5 pounds in order to increase function in daily activities.   Status at PN: 6 (+1). Progressing, goal not met.   1/20/22: Min A with three point pinch resistive exercise. Mod A 6# clip three point pinch.        5.  Pt will increase left wrist extension by 10 degrees or greater to increase function in cooking tasks.   Status at PN: 55 (+5).  Progressing, goal not met.        PLAN  []  Upgrade activities as tolerated YES Continue plan of care   []  Discharge due to :    []  Other:      Therapist: KEIRY Leon    Date: 1/25/2022 Time: 8:26 AM     Future Appointments   Date Time Provider Bob Pollard   1/25/2022  9:30 AM Girish Logan OT [facility]   1/27/2022  9:30 AM Girish Logan OT [facility]

## 2022-01-27 ENCOUNTER — APPOINTMENT (OUTPATIENT)
Dept: PHYSICAL THERAPY | Age: 82
End: 2022-01-27
Payer: MEDICARE

## 2022-01-31 ENCOUNTER — APPOINTMENT (OUTPATIENT)
Dept: PHYSICAL THERAPY | Age: 82
End: 2022-01-31
Payer: MEDICARE

## 2022-02-03 ENCOUNTER — HOSPITAL ENCOUNTER (OUTPATIENT)
Dept: PHYSICAL THERAPY | Age: 82
Discharge: HOME | End: 2022-02-03
Payer: MEDICARE

## 2022-02-03 PROCEDURE — 97530 THERAPEUTIC ACTIVITIES: CPT

## 2022-02-03 PROCEDURE — 97110 THERAPEUTIC EXERCISES: CPT

## 2022-02-03 PROCEDURE — 97018 PARAFFIN BATH THERAPY: CPT

## 2022-02-03 NOTE — PROGRESS NOTES
OCCUPATIONAL THERAPY - DAILY TREATMENT NOTE    Patient Name: Soraya Dillon        Date: 2/3/2022  : 1940   YES Patient  Verified  Visit #:   5   of   8  Insurance: Payor: Artur Corea / Plan: [place] MEDICARE PART A & B / Product Type: Medicare /      In time: 10:18 Out time: 10:45   Total Treatment Time: 42     Medicare/BCBS Time Tracking (below)   Total Timed Codes (min):  34 1:1 Treatment Time:  34     TREATMENT AREA =  L hand     SUBJECTIVE    Pain Level (on 0 to 10 scale):  1  / 10   Medication Changes/New allergies or changes in medical history, any new surgeries or procedures? NO    If yes, update Summary List   Subjective Functional Status/Changes:  []  No changes reported      \"I feel that I have gotten better. \"         OBJECTIVE    Modalities Rationale:     decrease inflammation and decrease pain to improve patient's ability to  and pinch for ADL participation. min [] Estim, type/location:                                      []  att     []  unatt     []  w/US     []  w/ice    []  w/heat    min []  Ultrasound, settings/location:      min []  Iontophoresis w/ dexamethasone, location:                                               []  take home patch       []  in clinic    min []  Ice     []  Heat    location/position:    8 min [x]  Paraffin:  location Left hand     min []  Vasopneumatic Device, press/temp:     min []  Other:    [x] Skin assessment post-treatment (if applicable):    [x]  intact    []  redness- no adverse reaction     []redness - adverse reaction:        26 min Therapeutic Exercise:  [x]  See flow sheet   Rationale:      increase ROM and increase strength to improve the patients ability to  and pinch for ADL participation.     -d/c  -see flow sheet      8 min Therapeutic Activity:    Rationale:    increase ROM and increase strength to improve the patients ability to  and pinch for ADL participation.    -screw/unscrew board         min Patient Education:  YES  Reviewed HEP   []  Progressed/Changed HEP based on:   Pt ed on continuing with HEP. Other Objective/Functional Measures:    Range of Motion                       Active         Norms Right Left 1/11/22  Left 2/3/22  Left    Wrist  Ext 0-70 60 50  55 (+5) 65 (+10)      Hand Strength:       3pt Pinch   Right  7   Left 5   1/11/22  Left 6 (+1)   2/3/22  Left  7 (+1)          Post Treatment Pain Level (on 0 to 10) scale:   0 / 10     ASSESSMENT  Assessment/Changes in Function:   Pt presents with increased left wrist extension (+10). Pt presents with increased left hand three point pinch strength (+1). Pt has met all goals and is ready to be d/c to a home exercise program.      []  See Progress Note/Recertification   Patient will continue to benefit from skilled OT services to address ROM deficits and address strength deficits in HEP to attain remaining goals. Progress toward goals / Updated goals:    1. Pt will be independent in home exercise program for wrist AROM exercises to improve wrist function and overall range of motion.   12/16/21: Thompson Sable for tendon glide exercises  12/29/21: Min visual cues for tendon glide exercises  Status at PN: Min verbal and visual cues for tendon glide and wrist AROM exercises. Goal not met.   1/18/22: Min visual cues for tendon glide exercises and wrist flexor stretch  Status at d/c: Independent with HEP. Goal met.      2. Pt will be independent in demonstrating and practicing joint protection strategies in order to increase function in home management tasks.   12/29/21: Introduced joint protection strategies  Status at PN: NT  Status at d/c: Independent with joint protection strategies. Goal met.      3. Pt will be familiar with using heat modalities before and/or after exercises to aid in relief of symptoms.   Status at PN: Pt reports not using heat at home.  Pt ed on importance of using heat before exercises and to aid in pain relief. Goal not met.    Status at d/c: Pt continues to report using heat modalities at home. Goal met.      4. Pt will increase left hand three point pinch strength by 5 pounds in order to increase function in daily activities.   Status at PN: 6 (+1). Progressing, goal not met.   1/20/22: Min A with three point pinch resistive exercise. Mod A 6# clip three point pinch.   Status at d/c: 7 (+1). WFL. Goal met.        5. Pt will increase left wrist extension by 10 degrees or greater to increase function in cooking tasks.   Status at PN: 55 (+5). Progressing, goal not met.   Status at d/c: 65 (+10). Goal met.       PLAN  []  Upgrade activities as tolerated NO Continue plan of care   []  Discharge due to :    []  Other:      Therapist: SUKHDEV Mccain/ERICK    Date: 2/3/2022 Time: 9:48 AM     Future Appointments   Date Time Provider Bob Pollard   2/3/2022 10:15 AM Maritza Cushing, OT [facility]

## 2022-02-03 NOTE — PROGRESS NOTES
[facility] PHYSICAL THERAPY  [facility] Ximena Mcqueen, [address] - Phone: [phone]  Fax: [phone]  [address] [place] [address]          Patient Name: Mayi Castellon : 1940   Medical/Treatment Diagnosis: Left hand pain [M79.642]  Right hand pain [M79.641]   Onset Date:     Referral Source: Matthias Ríos MD Start of [facility]): 2021   Prior Hospitalization: See Medical History Provider #: [id number]   Prior Level of Function: Cooking, home management, cleaning, working out (everyday for one hour), walking   Comorbidities: Osteoporosis, Arthritis, Acid Reflux    Medications: Verified on Patient Summary List   Visits from [facility]: 9 Missed Visits: 1       Goal/Measure of Progress Goal Met? 1. Pt will be independent in home exercise program for wrist AROM exercises to improve wrist function and overall range of motion.    Status at last Eval: Min visual cues for tendon glide exercises and wrist flexor stretch Current Status: Independent with HEP yes   2. Pt will be independent in demonstrating and practicing joint protection strategies in order to increase function in home management tasks.    Status at last Eval: Introduced joint protection strategies Current Status: Independent with joint protection strategies. yes   3. Pt will be familiar with using heat modalities before and/or after exercises to aid in relief of symptoms.    Status at last Eval: Pt reports not using heat at home. Pt ed on importance of using heat before exercises and to aid in pain relief. Current Status: Pt continues to report using heat modalities at home. yes   4. Pt will increase left hand three point pinch strength by 5 pounds in order to increase function in daily activities.    Status at last Eval:  6 (+1). Current Status: 7 (+1). WFL. yes     5.   Pt will increase left wrist extension by 10 degrees or greater to increase function in cooking tasks.  Status at last Eval: 55 (+5) Current Status: 65 (+10) yes     Key Functional Changes/Progress: Pt presents with increased left wrist extension (+10). Pt presents with increased left hand three point pinch strength (+1). Pt has met all goals and is ready to be d/c to a home exercise program.       Assessments/Recommendations: Discontinue therapy. Progressing towards or have reached established goals. If you have any questions/comments please contact us directly at [phone]. Thank you for allowing us to assist in the care of your patient. Therapist Signature: SUKHDEV Sanon/L Date: 2/3/22   Reporting Period: 1/11/2022 - 2/3/2022 Time: 9:53 AM       NOTE TO PHYSICIAN:  PLEASE COMPLETE THE ORDERS BELOW AND FAX TO   [facility]: [phone]. If you are unable to process this request in 24 hours please contact our office: [phone].    ___ I have read the above report and request that my patient be discharged from therapy.      Physician Signature:        Date:       Time:                                       Rafal Elena MD

## 2023-01-23 ENCOUNTER — HOSPITAL ENCOUNTER (OUTPATIENT)
Dept: PHYSICAL THERAPY | Age: 83
Discharge: HOME | End: 2023-01-23
Payer: MEDICARE

## 2023-01-23 PROCEDURE — 97162 PT EVAL MOD COMPLEX 30 MIN: CPT

## 2023-01-23 PROCEDURE — 97112 NEUROMUSCULAR REEDUCATION: CPT

## 2023-01-23 PROCEDURE — 97140 MANUAL THERAPY 1/> REGIONS: CPT

## 2023-01-23 NOTE — PROGRESS NOTES
[address] [place] [address] PHYSICAL THERAPY  [address] [place] [address] Jorden Mcqueen, [address] - Phone: [phone]  Fax: [phone] / [facility]  Patient Name: Donn Cabello : 1940   Medical   Diagnosis: Unspecified temporomandibular joint disorder, unspecified side [M26.609] Treatment Diagnosis: Right TMJ  C/S pain   Onset Date: 2022     Referral Source: Roxana Mcdonnell MD Start of Care [facility]): 2023   Prior Hospitalization: See medical history Provider #: [id number]   Prior Level of Function: Independent, chiropractic care for neck pain   Comorbidities: Hx C/S fusion, Hx Bell's Palsy, Right TSA pending   Medications: Verified on Patient Summary List   The Plan of Care and following information is based on the information from the initial evaluation.   ===========================================================================================  Assessment / key information: Patient is a [de-identified] y.o. female presenting with CC right sided jaw pain that radiates intermittently to the right side of her neck. Symptoms began early 2022, worsening over time and now limiting her ability to open her mouth. Patient reports she has not altered her diet, and instead has been chewing on the left side of her mouth. Patient educated re: temporary modifications to her diet, avoiding hard/raw/chewy foods. Cervical AROM limited in rotation to 45 degrees bilaterally. Jaw Opening- 32cm, Right lateral excursion = 4cm, Left lateral excusion = 10cm. Pt  will benefit from physical therapist management to address her impairments (listed below),  educate her, and improve her level of function.  Thanks for your referral.   ===========================================================================================  Eval Complexity: History: HIGH Complexity :3+ comorbidities / personal factors will impact the outcome/ POC Exam:HIGH Complexity : 4+ Standardized tests and measures addressing body structure, function, activity limitation and / or participation in recreation  Presentation: MEDIUM Complexity : Evolving with changing characteristics  Clinical Decision Making:MEDIUM Complexity : FOTO score of 26-74Overall Complexity:MEDIUM  Problem List: pain affecting function, decrease ROM, decrease strength, decrease ADL/ functional abilitiies, decrease activity tolerance, and decrease flexibility/ joint mobility  FOTO score: 43 indicating 57% functional disability  Treatment Plan may include any combination of the following: Therapeutic exercise, Neuromuscular reeducation, Manual therapy, Therapeutic activity, Self care/home management, Electric stim unattended , and Needle insertion w/o injection (3+ muscles)  Patient / Family readiness to learn indicated by: asking questions, trying to perform skills, and interest  Persons(s) to be included in education: patient (P)  Barriers to Learning/Limitations: None  Measures taken: N/a   Patient Goal (s): \"Open my mouth without pain\"   Patient self reported health status: good  Rehabilitation Potential: good  Short Term Goals: To be accomplished in  2-3  weeks:  1. Patient to be adherent to HEP to facilitate pain control with ADL's.  2. Patient to increase Jaw opening to > 40cm to facilitate ability to tolerate brushing her teeth. 3. Patient to demonstrate right lateral excursion > 10cm to facilitate ease in talking  Long Term Goals: To be accomplished in  4-6  weeks:  1. Patient to be Safe and Independent with HEP to self-manage/prevent symptoms after DC. 2. Patient to increase FS FOTO score to > 52 to indicate increased functional independence. 3. Patient to report no difficulty chewing meals. Frequency / Duration:   Patient to be seen  2  times per week for 4-6  weeks:  Patient / Caregiver education and instruction: activity modification and exercises.  We reviewed our facility's Patient Personal Responsibilities (PPR) form, particularly in regards to compliance towards her appointment time, our attendance policy, and her home exercise program. Patient was informed of possible discharge for non-compliance to our attendance policy per PPR form. We also discussed his POC as deemed appropriate by the treating therapist and physician. Patient verbalized understanding that she must show objective and functional improvement in an appropriate time frame. Patient verbalized understanding that should progress or compliance be lacking, we will contact the referring physician for further consultation to address and attempt to establish alternate treatment strategies as necessary and/or possibly discharge. Therapist Signature: Sheree Wan \"BJ\" Mateo Orourke, DPT, Cert. MDT, Cert. DN, Cert. SMT, Dip. Osteopractic Date: 5/45/8635   Certification Period: 1/23/23-4/22/23 Time: 3:14 PM   ===========================================================================================  I certify that the above Physical Therapy Services are being furnished while the patient is under my care. I agree with the treatment plan and certify that this therapy is necessary. Physician Signature:        Date:       Time:                                         Nikhil Jackson MD   Please sign and return to [facility] or you may fax the signed copy to [phone]. Thank you.

## 2023-01-23 NOTE — PROGRESS NOTES
PHYSICAL THERAPY - DAILY TREATMENT NOTE  Patient Name: Sophia Osei        Date: 2023  : 1940   [x]  Patient  Verified  Visit #:   1     Insurance: Payor: [place] MEDICARE / Plan: [place] MEDICARE PART A & B / Product Type: Medicare /      In time:   2:31          Out time:   3:21 Total Treatment Time (min):   50   Medicare Time Tracking (below)   Total Timed Codes (min):  23 1:1 Treatment Time:  23     SUBJECTIVE    Pain Level (on 0 to 10 scale):  7-8  / 10   Medication Changes/New allergies or changes in medical history, any new surgeries or procedures? []  No    []  Yes   If yes, update Summary List:    Subjective Functional Status/Changes:  []  No changes reported     HISTORY    Present Symptoms: right haw pain    Present since: ~1 month   [] Improving []  Unchanging [x]  Worsening          Commenced as a result of:    or [x]  No apparent reason    Symptoms at onset:  []  Neck []  Arm   []  Forearm [] Headache     Constant symptoms:   []  Neck []  Arm   []  Forearm [] Headache      Intermittent symptoms:  []  Neck []  Arm   []  Forearm [] Headache       Worse:  [] Bending []  Turning  []  Lying/ rising   [] Sitting []  On the move [] When still   []  Am/ as the day progresses/ pm  [x]Other: chewing, biting, yawning, sneezing     Other:    Better:   [] Bending []  Turning  []  Lying/ rising   [] Sitting []  On the move [] When still   []  Am/ as the day progresses/ pm  []Other:     Other:    General Health:  Red Flags Indicated? [] Yes    [] No  [] Yes [] No Recent weight change (If yes, due to dieting?  [] Yes  [] No)   [] Yes [] No Persistent cough  [] Yes [] No Unremitting pain at night  [] Yes [] No Dizziness  [] Yes [] No Blurred vision  [] Yes [] No Hands more cold or painful in cold weather  [] Yes [] No Ringing in ears  [] Yes [] No Difficulty swallowing  [] Yes [] No Dysfunction of bowel or bladder  [] Yes [] No Recent illness within past 3 weeks (i.e, cold, flu)  [] Yes [] No Jaw pain    Past History/Treatments:  Disturbed sleep: [x] No    [] Yes   Pillows: 1     Sleeping Postures:  []  Prone []  Supine   []  R side [] L side    [] Toss and Turn [] OOB     Surface: N/A []   [] Soft []  Firm []  Saggy     Number of previous episodes:Hx of C/S fusion, unable to recall what level. Reports chiropractic care. Diagnostic Tests: [] Lab work [] X-rays    [] CT [x] MRI       Results:pending    Recent or major surgery:  [] Yes [x]  No     Accidents:  [] Yes [x]  No     Headaches: Do you have headaches? [] Yes   [x] No  How often do you get headaches? How long does the headache last?  What aggravates it? What relieves it? Does the headache coincide with any other symptoms (visual disturbances, light sensitivity)? Where is the headache? Does it change locations? Other:Reports had been told by the dentist that she will need a crown. Declined the surgery until she is able to open her mouth. Work:  Mechanical Stresses: retired    Leisure: Mechanical Stresses: prime plus exercises    Functional disability from present episode: pain with jaw opening, chewing. 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     Cervicothoracic Differentiation Test: n/a    Neurological (upper):  Myotome Level Muscle Test Nerve Reflex Sensation   C5 Deltoid (C5&C6) Axillary N/A Acromion to Lateral Epicondyle    Biceps (C5&C6) Musculocutaneous Biceps Tendon Purest patch at the axillary nerve at the middle deltoid    Rhomboid C5 Dorsal Scapular      Supraspinatus & Infraspinatus (C5&C6) Suprascapular      Teres Minor (C5&C6) Axillary     C6 Wrist Extensor Group (C6&C7) Radial Brachioradialis Lateral Forearm and the \"6\" with the fingers    ECRL/ECRB Radial      Supinator Deep Branch Radial or PIN      ECU (C6&C7) Posterior Interosseus     C7 Triceps (C7&C8) Radial Triceps Middle Finger    Extensor Indicis C6/7/8 Posterior Interosseus      FCR C7 Median     C8 Abductor Policis Brevis (D3&W8) Median Nerve Recurrent Branch N/A Medial Forearm    Flexor Digitorum Superficialis C8 Median     T1 Dorsal Interossei T1 Ulnar Nerve N/A Medial Upper Arm    Abductor Digiti Quinti T1 Ulnar Nerve       Notable Deficits from above: unremarkable    Jaw Opening- 32cm  Right lateral excursion = 4cm  Left lateral excusion = 10cm    Test movements:  Describe effects on present pain- During: produces, abolishes, increases, decreases, no effect, centralising, peripheralising. After: better, worse, no better, no worse, no effect, centralised, peripheralised. Repeated jaw protrusion NE. Repeated right lateral deviation increases jaw opening. Upper Limb Tension Tests:        Ulnar: [x] R ([] +    [] -)   [x] L    ([] +    [] -)       Median: [x] R ([] +    [] -)   [x] L    ([] +    [] -)       Radial: [x] R ([] +    [] -)   [x] L    ([] +    [] -)    Special Tests:  Cervical:        Transverse Lig: [x] R ([] +    [] -)   [x] L    ([] +    [] -)       Spurling's:  [x] R ([] +    [] -)   [x] L    ([] +    [] -)       Distraction:  [x] R ([] +    [] -)   [x] L    ([] +    [] -)       Compression: [x] R ([] +    [] -)   [x] L    ([] +    [] -)    Muscle Flexibility: [] N/A   Scalenes: [] WNL [x] R   [x] L      [] Tight  [x] R    [x] L       Upper Trap: [] WNL [x] R   [x] L      [] Tight  [x] R    [x] L        Levator: [] WNL [x] R   [x] L      [] Tight  [x] R    [x] L        Pect.  Minor: [] WNL [x] R   [x] L      [] Tight  [x] R    [x] L         Therapeutic Procedures:  Min Procedure Specifics + Rationale   n/a [x]  Patient Education (performed throughout session) [x] Review HEP    [] Progressed/Changed HEP based on:   [] proper performance and advancement of Therex/TA   [] reduction in pain level    [] increased functional capacity       [] change in directional preference   15 []  Manual Therapy       [] IASTM    [] TDN (see objective; actual needle insertion time not billed)   [x] STM to C/S mm, manual UT/Levator stretch, STM to SCM  Rationale: decrease pain, increase ROM, increase tissue extensibility, decrease trigger points and increase postural awareness to attain functional use and participation with ADL's  [x] Skin assessment post-treatment:  [x]intact []redness- no adverse reaction   []redness - adverse   8 [x] Neuromuscular Re-ed   [x]  See Flowsheet    Rationale: increase ROM, increase strength, improve coordination, improve balance and increase proprioception  to improve the patients ability to safely participate in ADL's     Modality rationale: decrease inflammation, decrease pain, increase tissue extensibility and increase muscle contraction/control to improve the patients ability to perform ADL's with greater ease       Min Type Additional Details   10 [x]  Heat         [] pre-ABY      [x] post-ABY Location:CS    [] supine              [] prone     [] legs elevated    [] legs flat   [] sitting   [x] Skin assessment post-treatment:  [x]intact [x]redness- no adverse reaction       []redness - adverse reaction:       Post Treatment Pain Level (on 0 to 10) scale:   6  / 10     ASSESSMENT    Assessment:   SEE POC      []  See Progress Note/Recertification   Patient will continue to benefit from skilled PT services to See POC   Progress toward goals / Updated goals:    See POC     PLAN    []  Upgrade activities as tolerated YES Continue plan of care   []  Discharge due to :    []  Other:      Therapist: Linda Orourke \"BJ\" ZURI Jenkins, Cert. MDT, Cert. DN, Cert.  SMT    Date: 1/23/2023 Time: 2:17 PM     Future Appointments   Date Time Provider Bob Pollard   1/23/2023  2:30 PM Madeleine Velasquez PT [id number]

## 2023-01-24 ENCOUNTER — HOSPITAL ENCOUNTER (OUTPATIENT)
Dept: PHYSICAL THERAPY | Age: 83
Discharge: HOME | End: 2023-01-24
Payer: MEDICARE

## 2023-01-24 PROCEDURE — 97112 NEUROMUSCULAR REEDUCATION: CPT

## 2023-01-24 PROCEDURE — 97140 MANUAL THERAPY 1/> REGIONS: CPT

## 2023-01-24 NOTE — PROGRESS NOTES
PHYSICAL THERAPY - DAILY TREATMENT NOTE    Patient Name: Marquise Griffin        Date: 2023  : 1940   YES Patient  Verified  Visit #:   2   of     Insurance: Payor: Bennie Alexis / Plan: [place] MEDICARE PART A & B / Product Type: Medicare /      In time: 11:59 Out time: 12:34   Total Treatment Time: 35     Medicare/BCBS [place] Time Tracking (below)   Total Timed Codes (min):  25 1:1 Treatment Time:  25     TREATMENT AREA =  Unspecified temporomandibular joint disorder, unspecified side [M26.609]  SUBJECTIVE    Pain Level (on 0 to 10 scale):  7  / 10   Medication Changes/New allergies or changes in medical history, any new surgeries or procedures? NO    If yes, update Summary List   Subjective Functional Status/Changes:  []  No changes reported     Pt reports 7/10 pain. Pt reports that she has B shoulder pain, reports that UBE exacerbates it. Pt reports right-sided neck pain after UT stretch to right. Pt reports B shoulder pain with attempt at supine scap stab.        OBJECTIVE    Modalities Rationale:  decrease pain to improve patient's ability to perform ADLs/IADLs, functional mobility and gait safely and independently without increased pain/symptoms       min [] Estim, type/location:                                      []  att     []  unatt     []  w/US     []  w/ice    []  w/heat    min []  Mechanical Traction: type/lbs                   []  pro   []  sup   []  int   []  cont    []  before manual    []  after manual    min []  Ultrasound, settings/location:      min []  Iontophoresis w/ dexamethasone, location:                                               []  take home patch       []  in clinic   10 min []  Ice     [x]  Heat    location/position: C/S supine with wedge under B LE    min []  Vasopneumatic Device, press/temp:     min []  Other:    [x] Skin assessment post-treatment (if applicable):    [x]  intact    []  redness- no adverse reaction     []redness - adverse reaction:       15 min Neuromuscular Re-ed: See flow sheet   Rationale: improve posture and postural muscle activation to improve the patient's ability to perform ADLs/IADLs, functional mobility and gait safely and independently without increased pain/symptoms      10 min Manual Therapy: SOR, gentle manual C/S traction, C/S rotation stretch to left, right UT stretch, B pec stretch, DTM B C/S paraspinals/scalenes/UT/lev/SCM   Rationale:      decrease pain, increase ROM, and increase tissue extensibility to improve patient's ability to perform ADLs/IADLs, functional mobility and gait safely and independently without increased pain/symptoms  The manual therapy interventions were performed at a separate and distinct time from the therapeutic activities interventions. During NM min Patient Education:  YES  Reviewed HEP   []  Progressed/Changed HEP based on: Added new ex (see below)     Other Objective/Functional Measures:    Exercises per flow sheet    Held UBE retro due to c/o exacerbation of B shoulder pain  Held UT stretch to right after 1 reps due to c/o increased right-sided neck/jaw pain; held levator stretch to right  Held supine scap stabs due to exacerbation of B shoulder pain    Access Code: [email or web address]  URL: [email or web address]  Date: 01/24/2023  Prepared by: Ethel Mckeon    Exercises  Seated Cervical Rotation AROM - 1 x daily - 7 x weekly - 1 sets - 10 reps - 3 second hold  Seated Upper Trapezius Stretch - 1 x daily - 7 x weekly - 1 sets - 2-3 reps - 30 second hold  Gentle Levator Scapulae Stretch (Mirrored) - 1 x daily - 7 x weekly - 1 sets - 2-3 reps - 30 second hold       Post Treatment Pain Level (on 0 to 10) scale:   5  / 10     ASSESSMENT    Assessment/Changes in Function:     Tolerated exercises with minimal complaints as noted above     []  See Progress Note/Recertification   Patient will continue to benefit from skilled PT services to modify and progress therapeutic interventions, address functional mobility deficits, address ROM deficits, address strength deficits, analyze and address soft tissue restrictions, analyze and cue movement patterns, analyze and modify body mechanics/ergonomics, assess and modify postural abnormalities, and instruct in home and community integration to attain remaining goals. Progress toward goals / Updated goals:    Progressing toward goals:  Short Term Goals: To be accomplished in  2-3  weeks:  1. Patient to be adherent to HEP to facilitate pain control with ADL's. - Reports compliance, updated this session  2. Patient to increase Jaw opening to > 40cm to facilitate ability to tolerate brushing her teeth. 3. Patient to demonstrate right lateral excursion > 10cm to facilitate ease in talking  Long Term Goals: To be accomplished in  4-6  weeks:  1. Patient to be Safe and Independent with HEP to self-manage/prevent symptoms after DC. 2. Patient to increase FS FOTO score to > 52 to indicate increased functional independence. 3. Patient to report no difficulty chewing meals.       PLAN    [x]  Upgrade activities as tolerated YES Continue plan of care   []  Discharge due to :    []  Other:      Therapist: Inessa Bangura PT    Date: 1/24/2023 Time: 12:04 PM     Future Appointments   Date Time Provider Bob Pollard   1/31/2023 11:30 AM Neptali Brannon PT [facility]   2/3/2023 11:00 AM Madeleine Velasquez PT [facility]   2/7/2023 11:00 AM Hua Lua PT [facility]   2/9/2023 11:00 AM Hua Lua PT [facility]   2/14/2023 11:00 AM Hua Lua PT [facility]   2/16/2023 11:00 AM Hua Lua PT [facility]   2/21/2023 11:00 AM Madeleine Velasquez PT [facility]   2/23/2023 11:00 AM Madeleine Velasquez PT [facility]   2/28/2023 11:00 AM [facility]

## 2023-01-31 ENCOUNTER — HOSPITAL ENCOUNTER (OUTPATIENT)
Dept: PHYSICAL THERAPY | Age: 83
Discharge: HOME | End: 2023-01-31
Payer: MEDICARE

## 2023-01-31 PROCEDURE — 97112 NEUROMUSCULAR REEDUCATION: CPT

## 2023-01-31 PROCEDURE — 97140 MANUAL THERAPY 1/> REGIONS: CPT

## 2023-01-31 NOTE — PROGRESS NOTES
PHYSICAL THERAPY - DAILY TREATMENT NOTE    Patient Name: Leon Gonzalez        Date: 2023  : 1940   YES Patient  Verified  Visit #:   3   of     Insurance: Payor: Manuel Johnson / Plan: [place] MEDICARE PART A & B / Product Type: Medicare /      In time: 11:14 Out time: 11:58   Total Treatment Time: 40     Medicare/BCBS [place] Time Tracking (below)   Total Timed Codes (min):  34 1:1 Treatment Time:  34     TREATMENT AREA =  Unspecified temporomandibular joint disorder, unspecified side [M26.609]  SUBJECTIVE    Pain Level (on 0 to 10 scale):  7  / 10   Medication Changes/New allergies or changes in medical history, any new surgeries or procedures? NO    If yes, update Summary List   Subjective Functional Status/Changes:  []  No changes reported     Pt reports that she had a jaw MRI and was told that she has a bone that is stuck. Pt reports that her doctor is going to refer her to a specialist.  Pt reports that she needs a shoulder replacement, but he surgeon will not do that until she fixes the jaw. Pt reports that jaw protrusion increases pain, reports that right lateral deviation of jaw provides some relief.        OBJECTIVE    Modalities Rationale:  decrease pain to improve patient's ability to perform ADLs/IADLs, functional mobility safely and independently without increased pain/symptoms       min [] Estim, type/location:                                      []  att     []  unatt     []  w/US     []  w/ice    []  w/heat    min []  Mechanical Traction: type/lbs                   []  pro   []  sup   []  int   []  cont    []  before manual    []  after manual    min []  Ultrasound, settings/location:      min []  Iontophoresis w/ dexamethasone, location:                                               []  take home patch       []  in clinic   10 min []  Ice     [x]  Heat    location/position: C/S, supine with wedge under B LE    min []  Vasopneumatic Device, press/temp:     min []  Other:    [x] Skin assessment post-treatment (if applicable):    [x]  intact    []  redness- no adverse reaction     []redness - adverse reaction:      22 min Neuromuscular Re-ed: Exercises per flow sheet   Rationale:  improve posture and postural muscle activation to improve the patient's ability to perform ADLs/IADLs, functional mobility and gait safely and independently without increased pain/symptoms      12 min Manual Therapy: SOR, gentle manual C/S traction, C/S rotation stretch to left, right UT stretch, B pec stretch, DTM B C/S paraspinals/scalenes/UT/lev/SCM   Rationale:   decrease pain, increase ROM, and increase tissue extensibility to improve patient's ability to perform ADLs/IADLs, functional mobility and gait safely and independently without increased pain/symptoms  The manual therapy interventions were performed at a separate and distinct time from the therapeutic activities interventions. T/o session min Patient Education:  YES  Reviewed HEP   []  Progressed/Changed HEP based on:   Cont HEP; hold jaw protrusion     Other Objective/Functional Measures:    Exercises per flow sheet  Held jaw protrusion due to c/o increased pain with jaw protrusion  Added SCM stretch, shoulder shrugs, shoulder rolls     Post Treatment Pain Level (on 0 to 10) scale:   6  / 10     ASSESSMENT    Assessment/Changes in Function:     Tolerated exercises without c/o increased pain except jaw protrusion as noted above     []  See Progress Note/Recertification   Patient will continue to benefit from skilled PT services to modify and progress therapeutic interventions, address functional mobility deficits, address ROM deficits, address strength deficits, analyze and address soft tissue restrictions, analyze and cue movement patterns, analyze and modify body mechanics/ergonomics, assess and modify postural abnormalities, and instruct in home and community integration to attain remaining goals.    Progress toward goals / Updated goals:    Progressing toward goals:  Short Term Goals: To be accomplished in  2-3  weeks:  1. Patient to be adherent to HEP to facilitate pain control with ADL's. - Reports compliance, updated this session  2. Patient to increase Jaw opening to > 40cm to facilitate ability to tolerate brushing her teeth. 3. Patient to demonstrate right lateral excursion > 10cm to facilitate ease in talking  Long Term Goals: To be accomplished in  4-6  weeks:  1. Patient to be Safe and Independent with HEP to self-manage/prevent symptoms after DC. 2. Patient to increase FS FOTO score to > 52 to indicate increased functional independence. 3. Patient to report no difficulty chewing meals.        PLAN    [x]  Upgrade activities as tolerated YES Continue plan of care   []  Discharge due to :    []  Other:      Therapist: Michael Neri PT    Date: 1/31/2023 Time: 11:15 AM     Future Appointments   Date Time Provider Bob Pollard   1/31/2023 11:30 AM Jaimie Liu PT [facility]   2/3/2023 11:00 AM Olive Wallace PT [facility]   2/7/2023 11:00 AM Jonathan Hernandez PT [facility]   2/9/2023 11:00 AM Jonathan Hernandez PT [facility]   2/14/2023 11:00 AM Jonathan Hernandez PT [facility]   2/16/2023 11:00 AM Jonathan Hernandez PT [facility]   2/21/2023 11:00 AM Olive Wallace PT [facility]   2/23/2023 11:00 AM Olive Wallace PT [facility]   2/28/2023 11:00 AM [facility]

## 2023-02-03 ENCOUNTER — HOSPITAL ENCOUNTER (OUTPATIENT)
Dept: PHYSICAL THERAPY | Age: 83
Discharge: HOME | End: 2023-02-03
Payer: MEDICARE

## 2023-02-03 PROCEDURE — 97110 THERAPEUTIC EXERCISES: CPT

## 2023-02-03 PROCEDURE — 97140 MANUAL THERAPY 1/> REGIONS: CPT

## 2023-02-03 PROCEDURE — 97112 NEUROMUSCULAR REEDUCATION: CPT

## 2023-02-03 NOTE — PROGRESS NOTES
PHYSICAL THERAPY - DAILY TREATMENT NOTE    Patient Name: Michaela Diego        Date: 2/3/2023  : 1940   YES Patient  Verified  Visit #:   4     Insurance: Payor: Ammy Chung / Plan: [place] MEDICARE PART A & B / Product Type: Medicare /      In time: 10:54 Out time: 11:44   Total Treatment Time: 50     Medicare/BCBS Time Tracking (below)   Total Timed Codes (min):  50 1:1 Treatment Time:  40     TREATMENT AREA = Unspecified temporomandibular joint disorder, unspecified side [M26.609]    SUBJECTIVE    Pain Level (on 0 to 10 scale):  6  / 10   Medication Changes/New allergies or changes in medical history, any new surgeries or procedures? NO    If yes, update Summary List   Subjective Functional Status/Changes:  []  No changes reported     \"I don't want jaw surgery. I looked into it and it doesn't seem that effective. \"          OBJECTIVE     Therapeutic Procedures:  Min Procedure Specifics + Rationale   n/a [x]  Patient Education (performed throughout session) [x] Review HEP    [] Progressed/Changed HEP based on:   [] proper performance and advancement of Therex/TA   [] reduction in pain level    [] increased functional capacity       [] change in directional preference   15 [x] Therapeutic Exercise   [x]  See Flowsheet   Rationale: increase ROM and increase strength to improve the patients ability to participate in ADL's    15 [x]  Manual Therapy       [] IASTM -   [x] Supine repeated retractions, SOR  Rationale: decrease pain, increase ROM, increase tissue extensibility, decrease trigger points and increase postural awareness to attain functional use and participation with ADL's  [x] Skin assessment post-treatment:  [x]intact [x]redness- no adverse reaction   []redness - adverse  The manual therapy interventions were performed at a separate and distinct time from the therapeutic activities interventions.      10 [x] Neuromuscular Re-ed   [x]  See Flowsheet    Rationale: reeducation of movement, balance, coordination, kinesthetic sense, posture, and/or proprioception to improve the patients ability to safely participate in ADL's           Other Objective/Functional Measures:    Discussed needling and soft foods/dietary habits       Post Treatment Pain Level (on 0 to 10) scale:   4  / 10     ASSESSMENT    Assessment/Changes in Function:     Performed prescribed therex without worsening of symptoms         Patient will continue to benefit from skilled PT services to modify and progress therapeutic interventions, address functional mobility deficits, address ROM deficits, address strength deficits, analyze and address soft tissue restrictions, analyze and cue movement patterns, analyze and modify body mechanics/ergonomics, and instruct in home and community integration  to attain remaining goals   Progress toward goals / Updated goals:    \Patient states she will consider needling in the future     PLAN    [x]  Upgrade activities as tolerated  [x]  Update interventions per flow sheet YES Continue plan of care   []  Discharge due to :    []  Other:      Therapist: Citlalli Delarosa \"BJ\" ZURI Jenkins, Cert. MDT, Cert. DN, Cert. SMT, Dip.  Osteopractic    Date: 2/3/2023 Time: 11:28 AM     Future Appointments   Date Time Provider Bob Pollard   2/7/2023 11:00 AM Kathlee Merlin, PT [facility]   2/9/2023 11:00 AM Kathlee Merlin, PT [facility]   2/14/2023 11:00 AM Kathlee Merlin, PT [facility]   2/16/2023 11:00 AM Kathlee Merlin, PT [facility]   2/21/2023 11:00 AM Judith Yang PT [facility]   2/23/2023 11:00 AM Judith Yang, PT [facility]   2/28/2023 11:00 AM Kathlee Merlin, PT [facility]

## 2023-02-07 ENCOUNTER — APPOINTMENT (OUTPATIENT)
Dept: PHYSICAL THERAPY | Age: 83
End: 2023-02-07
Payer: MEDICARE

## 2023-02-09 ENCOUNTER — HOSPITAL ENCOUNTER (OUTPATIENT)
Dept: PHYSICAL THERAPY | Age: 83
Discharge: HOME | End: 2023-02-09
Payer: MEDICARE

## 2023-02-09 PROCEDURE — 97140 MANUAL THERAPY 1/> REGIONS: CPT

## 2023-02-09 PROCEDURE — 97112 NEUROMUSCULAR REEDUCATION: CPT

## 2023-02-09 NOTE — PROGRESS NOTES
PHYSICAL THERAPY - DAILY TREATMENT NOTE    Patient Name: Kylee Ross        Date: 2023  : 1940   YES Patient  Verified  Visit #:   5   of     Insurance: Payor: Nelli Beal / Plan: [place] MEDICARE PART A & B / Product Type: Medicare /      In time: 11:03 Out time: 11:46   Total Treatment Time: 43     Medicare/BCBS [place] Time Tracking (below)   Total Timed Codes (min):  33 1:1 Treatment Time:  33     TREATMENT AREA =  Unspecified temporomandibular joint disorder, unspecified side [M26.609]    SUBJECTIVE    Pain Level (on 0 to 10 scale):  6  / 10   Medication Changes/New allergies or changes in medical history, any new surgeries or procedures?    no    If yes, update Summary List   Subjective Functional Status/Changes:  []  No changes reported     \"I am seeing a TMJ specialist (dentist) on Monday. \"     Compliance with HEP  Yes [x] No []     OBJECTIVE    Modalities Rationale:  decrease pain and increase tissue extensibility to improve patient's ability to perform ADL's and functional mobility with decreased pain.      min [] Estim, type/location:                                      []  att     []  unatt     []  w/US     []  w/ice    []  w/heat    min []  Mechanical Traction: type/lbs                   []  pro   []  sup   []  int   []  cont    []  before manual    []  after manual    min []  Ultrasound, settings/location:      min []  Iontophoresis w/ dexamethasone, location:                                               []  take home patch       []  in clinic   10 min []  Ice     [x]  Heat      Location:C/S    [x] supine             [] prone     [x] legs elevated  [] legs flat  [] sitting                [] sidelying - [] left [] right     min []  Vasopneumatic Device, press/temp:     min []  Other:    [x] Skin assessment post-treatment (if applicable):    [x]  intact    []  redness- no adverse reaction     []redness - adverse reaction:      21 min Neuromuscular Re-ed: See flowsheet   Rationale: increase ROM, increase strength, improve coordination, improve balance, and increase proprioception to improve the patients ability to perform ADL's, gait, and functional mobility with decreased pain and improved joint mechanics. 12 min Manual Therapy: SOR, manual cervical traction, MFR/STM to B cervical musculature with emphasis on right SCM, UT and scalenes   Rationale:      decrease pain, increase ROM, and increase tissue extensibility to improve patient's ability to perform ADL's and functional mobility with decreased pain and improved joint mechanics. The manual therapy interventions were performed at a separate and distinct time from the therapeutic activities interventions. min Patient Education:  YES  Reviewed HEP   []  Progressed/Changed HEP based on:   Cont HEP     Other Objective/Functional Measures: Added jaw isometrics  Attempted to add theraband row but patient unable to perform due to complaints of left shoulder pain. Post Treatment Pain Level (on 0 to 10) scale:   0  / 10     ASSESSMENT    Assessment/Changes in Function:     Increased muscle turgor noted in right UT     []  See Progress Note/Recertification   Patient will continue to benefit from skilled PT services to modify and progress therapeutic interventions, address functional mobility deficits, address ROM deficits, address strength deficits, analyze and address soft tissue restrictions, analyze and cue movement patterns, analyze and modify body mechanics/ergonomics, assess and modify postural abnormalities, and instruct in home and community integration to attain remaining goals. Progress toward goals / Updated goals:    Short Term Goals: To be accomplished in  2-3  weeks:  1. Patient to be adherent to HEP to facilitate pain control with ADL's. - Reports compliance  2. Patient to increase Jaw opening to > 40cm to facilitate ability to tolerate brushing her teeth.   3. Patient to demonstrate right lateral excursion > 10cm to facilitate ease in talking. Cont AROM jaw lateral deviation  Long Term Goals: To be accomplished in  4-6  weeks:  1. Patient to be Safe and Independent with HEP to self-manage/prevent symptoms after DC. 2. Patient to increase FS FOTO score to > 52 to indicate increased functional independence. 3. Patient to report no difficulty chewing meals.      PLAN    [x]  Upgrade activities as tolerated YES Continue plan of care   []  Discharge due to :    []  Other:      Therapist: Aletha Kothari PT    Date: 2/9/2023 Time: 11:07 AM     Future Appointments   Date Time Provider Bob Pollard   2/14/2023 11:00 AM Kevin Blake, PT [facility]   2/16/2023 11:00 AM Kevin Blake PT [facility]   2/21/2023 11:00 AM Nate Romero PT [facility]   2/23/2023 11:00 AM Nate Romero PT [facility]   2/28/2023 11:00 AM Kevin Blake PT [facility]

## 2023-02-14 ENCOUNTER — APPOINTMENT (OUTPATIENT)
Dept: PHYSICAL THERAPY | Age: 83
End: 2023-02-14
Payer: MEDICARE

## 2023-02-16 ENCOUNTER — APPOINTMENT (OUTPATIENT)
Dept: PHYSICAL THERAPY | Age: 83
End: 2023-02-16
Payer: MEDICARE

## 2023-02-16 NOTE — PROGRESS NOTES
[facility]  [address] [place] [address] Mita Mcqueen, [address] - Phone: [phone]  Fax: [phone]  [facility]      Dear :  Marifer Rosales MD, under your direction, we have been providing physical therapy for your patient Karine Wagner, for a diagnosis of Unspecified temporomandibular joint disorder, unspecified side [M26.609]. The patient was scheduled for 8-16 visits. They attended 5 of them and was last seen on 2/9/2023. Patient called on 2/14/2023 to cancel all further appointments, stating her dentist wanted her to stop physical therapy at this time. Per her request, we are discharging the patient from physical therapy at this time. We appreciate the kind referral and would willingly work with this patient again, if she needs further outpatient physical therapy. Your patient's health is our primary concern. Should you have any further questions or concerns, please feel free to contact me at your convenience. Sincerely,     Elo Alaniz, PT                   2/16/2023      NOTE TO PHYSICIAN:  PLEASE COMPLETE THE ORDERS BELOW AND FAX TO   [facility] Physical Therapy: [phone]. If you are unable to process this request in 24 hours please contact our office: [phone].    ___ I have read the above report and request that my patient be discharged from therapy.      Physician Signature:        Date:       Time:                                        Marifer Rosales MD

## 2023-02-21 ENCOUNTER — APPOINTMENT (OUTPATIENT)
Dept: PHYSICAL THERAPY | Age: 83
End: 2023-02-21
Payer: MEDICARE

## 2023-02-23 ENCOUNTER — APPOINTMENT (OUTPATIENT)
Dept: PHYSICAL THERAPY | Age: 83
End: 2023-02-23
Payer: MEDICARE

## 2023-02-28 ENCOUNTER — APPOINTMENT (OUTPATIENT)
Dept: PHYSICAL THERAPY | Age: 83
End: 2023-02-28
Payer: MEDICARE

## 2023-06-01 ENCOUNTER — HOSPITAL ENCOUNTER (OUTPATIENT)
Facility: HOSPITAL | Age: 83
Setting detail: RECURRING SERIES
Discharge: HOME | End: 2023-06-04
Payer: MEDICARE

## 2023-06-01 PROCEDURE — 97110 THERAPEUTIC EXERCISES: CPT

## 2023-06-01 PROCEDURE — 97530 THERAPEUTIC ACTIVITIES: CPT

## 2023-06-01 PROCEDURE — 97161 PT EVAL LOW COMPLEX 20 MIN: CPT

## 2023-06-06 ENCOUNTER — HOSPITAL ENCOUNTER (OUTPATIENT)
Facility: HOSPITAL | Age: 83
Setting detail: RECURRING SERIES
Discharge: HOME | End: 2023-06-09
Payer: MEDICARE

## 2023-06-06 PROCEDURE — 97110 THERAPEUTIC EXERCISES: CPT

## 2023-06-06 PROCEDURE — 97140 MANUAL THERAPY 1/> REGIONS: CPT

## 2023-06-06 PROCEDURE — 97016 VASOPNEUMATIC DEVICE THERAPY: CPT

## 2023-06-06 NOTE — PROGRESS NOTES
Therapeutic Exercise (timed):  increase ROM, strength, coordination, balance, and proprioception to improve patient's ability to progress to PLOF and address remaining functional goals. (see flow sheet as applicable)    Details if applicable:       x  37149 Therapeutic Activity (timed):  use of dynamic activities replicating functional movements to increase ROM, strength, coordination, balance, and proprioception in order to improve patient's ability to progress to PLOF and address remaining functional goals. (see flow sheet as applicable)     Details if applicable:       5  63148 Neuromuscular Re-Education (timed):  improve balance, coordination, kinesthetic sense, posture, core stability and proprioception to improve patient's ability to develop conscious control of individual muscles and awareness of position of extremities in order to progress to PLOF and address remaining functional goals. (see flow sheet as applicable)     Details if applicable:       12  81306 Manual Therapy (timed):  decrease pain and increase ROM to improve patient's ability to progress to PLOF and address remaining functional goals. The manual therapy interventions were performed at a separate and distinct time from the therapeutic activities interventions .  (see flow sheet as applicable)     Details if applicable:    PROM with gentle distraction, within MD protocol  Supine flex <90deg  ER <30 deg         Details if applicable:     29  [facility] Totals Reminder: bill using total billable min of TIMED therapeutic procedures (example: do not include dry needle or estim unattended, both untimed codes, in totals to left)  8-22 min = 1 unit; 23-37 min = 2 units; 38-52 min = 3 units; 53-67 min = 4 units; 68-82 min = 5 units   Total Total     [x]  Patient Education billed concurrently with other procedures   [x] Review HEP    [] Progressed/Changed HEP, detail:  [x] Other detail: avoid elbow contracture with 20-30 mins a day spent with R arm in

## 2023-06-16 ENCOUNTER — HOSPITAL ENCOUNTER (OUTPATIENT)
Facility: HOSPITAL | Age: 83
Setting detail: RECURRING SERIES
Discharge: HOME | End: 2023-06-19
Payer: MEDICARE

## 2023-06-16 PROCEDURE — 97112 NEUROMUSCULAR REEDUCATION: CPT

## 2023-06-16 PROCEDURE — 97530 THERAPEUTIC ACTIVITIES: CPT

## 2023-06-16 PROCEDURE — 97016 VASOPNEUMATIC DEVICE THERAPY: CPT

## 2023-06-16 PROCEDURE — 97110 THERAPEUTIC EXERCISES: CPT

## 2023-06-20 ENCOUNTER — HOSPITAL ENCOUNTER (OUTPATIENT)
Facility: HOSPITAL | Age: 83
Setting detail: RECURRING SERIES
Discharge: HOME | End: 2023-06-23
Payer: MEDICARE

## 2023-06-20 PROCEDURE — 97016 VASOPNEUMATIC DEVICE THERAPY: CPT

## 2023-06-20 PROCEDURE — 97110 THERAPEUTIC EXERCISES: CPT

## 2023-06-20 PROCEDURE — 97112 NEUROMUSCULAR REEDUCATION: CPT

## 2023-06-20 PROCEDURE — 97530 THERAPEUTIC ACTIVITIES: CPT

## 2023-06-20 NOTE — PROGRESS NOTES
PHYSICAL / OCCUPATIONAL THERAPY - DAILY TREATMENT NOTE (updated )    Patient Name: Ari Guerin    Date: 2023    : 1940  Insurance: Payor: MEDICARE / Plan: MEDICARE PART A AND B / Product Type: *No Product type* /      Patient  verified yes     Visit #   Current / Total 6 10 Total Time   Time   In / Out 11:00 12:08 68   Pain   In / Out 0 0    Subjective Functional Status/Changes: I went and saw the PA, they gave me their protocol. TREATMENT AREA =  Pain in right shoulder [M25.511]    OBJECTIVE    Modalities:  Modalities Rationale:   decrease pain and decrease edema to improve patient's ability to progress to PLOF and address remaining functional goals. 10 min [x]  Vasopneumatic Device, press/temp:  Position: mod / low  reclined   If using vaso (only need to measure limb vaso being performed on)      pre-treatment girth : not captured      post-treatment girth :not captured      measured at (landmark location) :  distal acromion   --      Skin assessment post-treatment (if applicable):    [x]  intact    []  redness- no adverse reaction                 []redness - adverse reaction:            Therapeutic Procedures:  58  Total 41  Total  [facility] Totals Reminder: bill using total billable min of TIMED therapeutic procedures (example: do not include dry needle or estim unattended, both untimed codes, in totals to left)  8-22 min = 1 unit; 23-37 min = 2 units; 38-52 min = 3 units; 53-67 min = 4 units; 68-82 min = 5 units   Tx Min Billable or 1:1 Min (if diff from Tx Min) Procedure, Rationale, Specifics   32 17 [id number] Therapeutic Exercise (timed):  increase ROM, strength, coordination, balance, and proprioception to improve patient's ability to progress to PLOF and address remaining functional goals.  (see flow sheet as applicable)   Details if applicable:       10 10 01903 Therapeutic Activity (timed):  use of dynamic activities replicating functional movements to increase ROM, strength,

## 2023-06-23 ENCOUNTER — HOSPITAL ENCOUNTER (OUTPATIENT)
Facility: HOSPITAL | Age: 83
Setting detail: RECURRING SERIES
Discharge: HOME | End: 2023-06-26
Payer: MEDICARE

## 2023-06-23 PROCEDURE — 97110 THERAPEUTIC EXERCISES: CPT

## 2023-06-23 PROCEDURE — 97016 VASOPNEUMATIC DEVICE THERAPY: CPT

## 2023-06-23 PROCEDURE — 97530 THERAPEUTIC ACTIVITIES: CPT

## 2023-06-23 PROCEDURE — 97112 NEUROMUSCULAR REEDUCATION: CPT

## 2023-06-26 ENCOUNTER — HOSPITAL ENCOUNTER (OUTPATIENT)
Facility: HOSPITAL | Age: 83
Setting detail: RECURRING SERIES
Discharge: HOME | End: 2023-06-29
Payer: MEDICARE

## 2023-06-26 PROCEDURE — 97016 VASOPNEUMATIC DEVICE THERAPY: CPT

## 2023-06-26 PROCEDURE — 97112 NEUROMUSCULAR REEDUCATION: CPT

## 2023-06-26 PROCEDURE — 97110 THERAPEUTIC EXERCISES: CPT

## 2023-06-26 PROCEDURE — 97140 MANUAL THERAPY 1/> REGIONS: CPT

## 2023-06-28 ENCOUNTER — HOSPITAL ENCOUNTER (OUTPATIENT)
Facility: HOSPITAL | Age: 83
Setting detail: RECURRING SERIES
Discharge: HOME | End: 2023-07-01
Payer: MEDICARE

## 2023-06-28 PROCEDURE — 97112 NEUROMUSCULAR REEDUCATION: CPT

## 2023-06-28 PROCEDURE — 97110 THERAPEUTIC EXERCISES: CPT

## 2023-06-28 PROCEDURE — 97016 VASOPNEUMATIC DEVICE THERAPY: CPT

## 2023-07-03 ENCOUNTER — HOSPITAL ENCOUNTER (OUTPATIENT)
Facility: HOSPITAL | Age: 83
Setting detail: RECURRING SERIES
Discharge: HOME | End: 2023-07-06
Payer: MEDICARE

## 2023-07-03 PROCEDURE — 97110 THERAPEUTIC EXERCISES: CPT

## 2023-07-03 PROCEDURE — 97140 MANUAL THERAPY 1/> REGIONS: CPT

## 2023-07-03 PROCEDURE — 97112 NEUROMUSCULAR REEDUCATION: CPT

## 2023-07-03 NOTE — PROGRESS NOTES
PHYSICAL / OCCUPATIONAL THERAPY - DAILY TREATMENT NOTE (updated )    Patient Name: Bennett Maharaj    Date: 7/3/2023    : 1940  Insurance: Payor: MEDICARE / Plan: MEDICARE PART A AND B / Product Type: *No Product type* /      Patient  verified yes     Visit #   Current / Total 1 10 Total Time   Time   In / Out 11:00 am 12:05 pm 65   Pain   In / Out 2/10 0/10    Subjective Functional Status/Changes: \"I still have pain in my elbow when I move it certain ways. \"     TREATMENT AREA =  Pain in right shoulder [M25.511]    OBJECTIVE    Modalities:  Modalities Rationale:   decrease pain and decrease edema to improve patient's ability to progress to PLOF and address remaining functional goals. 10 min [x]  Vasopneumatic Device, press/temp:  Position: mod / low  reclined   If using vaso (only need to measure limb vaso being performed on)      pre-treatment girth : not captured      post-treatment girth :not captured      measured at (landmark location) :  distal acromion   --      Skin assessment post-treatment (if applicable):    [x]  intact    []  redness- no adverse reaction                 []redness - adverse reaction:              Therapeutic Procedures:  55  Total 40  Total [facility] Totals Reminder: bill using total billable min of TIMED therapeutic procedures (example: do not include dry needle or estim unattended, both untimed codes, in totals to left)  8-22 min = 1 unit; 23-37 min = 2 units; 38-52 min = 3 units; 53-67 min = 4 units; 68-82 min = 5 units   Tx Min Billable or 1:1 Min (if diff from Tx Min) Procedure, Rationale, Specifics   23 18 25566 Therapeutic Exercise (timed):  increase ROM, strength, coordination, balance, and proprioception to improve patient's ability to progress to PLOF and address remaining functional goals.  (see flow sheet as applicable)   Details if applicable:       10 0 70422 Therapeutic Activity (timed):  use of dynamic activities replicating functional movements to

## 2023-07-07 ENCOUNTER — HOSPITAL ENCOUNTER (OUTPATIENT)
Facility: HOSPITAL | Age: 83
Setting detail: RECURRING SERIES
Discharge: HOME | End: 2023-07-10
Payer: MEDICARE

## 2023-07-07 PROCEDURE — 97110 THERAPEUTIC EXERCISES: CPT

## 2023-07-07 PROCEDURE — 97016 VASOPNEUMATIC DEVICE THERAPY: CPT

## 2023-07-07 PROCEDURE — 97140 MANUAL THERAPY 1/> REGIONS: CPT

## 2023-07-07 PROCEDURE — 97112 NEUROMUSCULAR REEDUCATION: CPT

## 2023-07-10 ENCOUNTER — HOSPITAL ENCOUNTER (OUTPATIENT)
Facility: HOSPITAL | Age: 83
Setting detail: RECURRING SERIES
Discharge: HOME | End: 2023-07-13
Payer: MEDICARE

## 2023-07-10 PROCEDURE — 97110 THERAPEUTIC EXERCISES: CPT

## 2023-07-10 PROCEDURE — 97016 VASOPNEUMATIC DEVICE THERAPY: CPT

## 2023-07-10 PROCEDURE — 97112 NEUROMUSCULAR REEDUCATION: CPT

## 2023-07-10 PROCEDURE — 97140 MANUAL THERAPY 1/> REGIONS: CPT

## 2023-07-10 NOTE — PROGRESS NOTES
PHYSICAL / OCCUPATIONAL THERAPY - DAILY TREATMENT NOTE (updated )    Patient Name: Jose Munoz    Date: 7/10/2023    : 1940  Insurance: Payor: MEDICARE / Plan: MEDICARE PART A AND B / Product Type: *No Product type* /      Patient  verified yes     Visit #   Current / Total 3 10 Total Time   Time   In / Out 10:20 11:30 70   Pain   In / Out 0 0    Subjective Functional Status/Changes: The numbness/tingling is still there. I see the doctor tomorrow. TREATMENT AREA =  Pain in right shoulder [M25.511]    OBJECTIVE    Modalities:  Modalities Rationale:   decrease pain and decrease edema to improve patient's ability to progress to PLOF and address remaining functional goals. 10 min [x]  Vasopneumatic Device, press/temp:  Position: mod / low  reclined   If using vaso (only need to measure limb vaso being performed on)      pre-treatment girth : not captured      post-treatment girth :not captured      measured at (landmark location) :  distal acromion   --      Skin assessment post-treatment (if applicable):    [x]  intact    []  redness- no adverse reaction                 []redness - adverse reaction:                 Therapeutic Procedures:  60  Total 42  Total  [facility] Totals Reminder: bill using total billable min of TIMED therapeutic procedures (example: do not include dry needle or estim unattended, both untimed codes, in totals to left)  8-22 min = 1 unit; 23-37 min = 2 units; 38-52 min = 3 units; 53-67 min = 4 units; 68-82 min = 5 units   Tx Min Billable or 1:1 Min (if diff from Tx Min) Procedure, Rationale, Specifics   30 20 69508 Therapeutic Exercise (timed):  increase ROM, strength, coordination, balance, and proprioception to improve patient's ability to progress to PLOF and address remaining functional goals.  (see flow sheet as applicable)   Details if applicable:       20  97512 Neuromuscular Re-Education (timed):  improve balance, coordination, kinesthetic sense, posture, core

## 2023-07-12 ENCOUNTER — HOSPITAL ENCOUNTER (OUTPATIENT)
Facility: HOSPITAL | Age: 83
Setting detail: RECURRING SERIES
Discharge: HOME | End: 2023-07-15
Payer: MEDICARE

## 2023-07-12 PROCEDURE — 97110 THERAPEUTIC EXERCISES: CPT

## 2023-07-12 PROCEDURE — 97112 NEUROMUSCULAR REEDUCATION: CPT

## 2023-07-12 PROCEDURE — 97016 VASOPNEUMATIC DEVICE THERAPY: CPT

## 2023-07-12 NOTE — PROGRESS NOTES
PHYSICAL / OCCUPATIONAL THERAPY - DAILY TREATMENT NOTE (updated )    Patient Name: Jairo Aguilar    Date: 2023    : 1940  Insurance: Payor: MEDICARE / Plan: MEDICARE PART A AND B / Product Type: *No Product type* /      Patient  verified yes     Visit #   Current / Total 4 10 Total Time   Time   In / Out 10:22 11:13 51   Pain   In / Out 0 0    Subjective Functional Status/Changes: My doctor is very happy with my progress. TREATMENT AREA =  Pain in right shoulder [M25.511]    OBJECTIVE    Modalities:  Modalities Rationale:   decrease pain and decrease edema to improve patient's ability to progress to PLOF and address remaining functional goals. 10 min [x]  Vasopneumatic Device, press/temp:  Position: mod / low  reclined   If using vaso (only need to measure limb vaso being performed on)      pre-treatment girth : not captured      post-treatment girth :not captured      measured at (landmark location) :  distal acromion   --      Skin assessment post-treatment (if applicable):    [x]  intact    []  redness- no adverse reaction                 []redness - adverse reaction:              Therapeutic Procedures:  41  Total 32  Total [facility] Totals Reminder: bill using total billable min of TIMED therapeutic procedures (example: do not include dry needle or estim unattended, both untimed codes, in totals to left)  8-22 min = 1 unit; 23-37 min = 2 units; 38-52 min = 3 units; 53-67 min = 4 units; 68-82 min = 5 units   Tx Min Billable or 1:1 Min (if diff from Tx Min) Procedure, Rationale, Specifics   25 18 47252 Therapeutic Exercise (timed):  increase ROM, strength, coordination, balance, and proprioception to improve patient's ability to progress to PLOF and address remaining functional goals.  (see flow sheet as applicable)   Details if applicable:       16 14 28475 Neuromuscular Re-Education (timed):  improve balance, coordination, kinesthetic sense, posture, core stability and

## 2023-07-17 ENCOUNTER — HOSPITAL ENCOUNTER (OUTPATIENT)
Facility: HOSPITAL | Age: 83
Setting detail: RECURRING SERIES
Discharge: HOME | End: 2023-07-20
Payer: MEDICARE

## 2023-07-17 PROCEDURE — 97112 NEUROMUSCULAR REEDUCATION: CPT

## 2023-07-17 PROCEDURE — 97110 THERAPEUTIC EXERCISES: CPT

## 2023-07-17 PROCEDURE — 97016 VASOPNEUMATIC DEVICE THERAPY: CPT

## 2023-07-17 NOTE — PROGRESS NOTES
AAROM  Current: not met, remains 37 deg (7/10/23)  - Goal: Pt to demo at least 4+/5 right shoulder flexion MMT to improve ease with lifting overhead. Status at last note/certification: able to perform isometric contraction at side  Current: progressing, able to perform wall wall slide without increased pain, though scapular hike persists (7/17/23)  - Goal: Patient will increase FOTO score to at least 60 pts to demonstrate increased functional mobility.   Status at last note/certification: FOTO 34 pts     PLAN  [x] Continue plan of care  [x]  Upgrade activities as tolerated  []  Discharge due to :  []  Other:    Lajuan Blizzard, PT    7/17/2023    10:40 AM    Future Appointments   Date Time Provider 4600  [address]   7/19/2023 10:20 AM Matt Myrick, PTA [facility]   7/24/2023 10:20 AM Lajuan Blizzard, PT [facility]   7/26/2023 10:20 AM Lajuan Blizzard, PT [facility]

## 2023-07-19 ENCOUNTER — HOSPITAL ENCOUNTER (OUTPATIENT)
Facility: HOSPITAL | Age: 83
Setting detail: RECURRING SERIES
Discharge: HOME | End: 2023-07-22
Payer: MEDICARE

## 2023-07-19 PROCEDURE — 97016 VASOPNEUMATIC DEVICE THERAPY: CPT

## 2023-07-19 PROCEDURE — 97110 THERAPEUTIC EXERCISES: CPT

## 2023-07-19 PROCEDURE — 97112 NEUROMUSCULAR REEDUCATION: CPT

## 2023-07-19 NOTE — PROGRESS NOTES
(timed):  improve balance, coordination, kinesthetic sense, posture, core stability and proprioception to improve patient's ability to develop conscious control of individual muscles and awareness of position of extremities in order to progress to PLOF and address remaining functional goals. (see flow sheet as applicable)     Details if applicable:           [x]  Patient Education billed concurrently with other procedures   [x] Review HEP    [] Progressed/Changed HEP, detail:    [] Other detail:       Objective Information/Functional Measures/Assessment:  Patient with most difficulty avoiding excessive scapular elevation during all active-assisted shoulder elevation attempts, benefiting from cueing for maintenance of scapular set. Improved control noted in side-lying, likely due to gravity-minimized position for the anterior deltoid, although with quick onset of posterior shoulder fatigue. Patient will continue to benefit from skilled PT / OT services to modify and progress therapeutic interventions, analyze and address functional mobility deficits, analyze and address ROM deficits, analyze and address strength deficits, analyze and address soft tissue restrictions, analyze and cue for proper movement patterns, analyze and modify for postural abnormalities, analyze and address imbalance/dizziness, and instruct in home and community integration to address functional deficits and attain remaining goals. Progress toward goals / Updated goals:  []  See Progress Note/Recertification    - Goal: Pt to demo right elbow AROM of 0-140 deg to improve ease with feeding tasks. Status at last note/certification: 1-684 deg   Current:   - Goal: Pt to demo right shoulder flexion AROM in standing of at least 140 deg to improve ease with cooking tasks.   Status at last note/certification: not tested  Current: progressing, 115 deg (7/10/23)  - Goal: Pt to demo right shoulder ER AROM in supine of at least 50 deg without pain to

## 2023-07-24 ENCOUNTER — HOSPITAL ENCOUNTER (OUTPATIENT)
Facility: HOSPITAL | Age: 83
Setting detail: RECURRING SERIES
Discharge: HOME | End: 2023-07-27
Payer: MEDICARE

## 2023-07-24 PROCEDURE — 97110 THERAPEUTIC EXERCISES: CPT

## 2023-07-24 PROCEDURE — 97016 VASOPNEUMATIC DEVICE THERAPY: CPT

## 2023-07-24 PROCEDURE — 97112 NEUROMUSCULAR REEDUCATION: CPT

## 2023-07-24 NOTE — PROGRESS NOTES
PHYSICAL / OCCUPATIONAL THERAPY - DAILY TREATMENT NOTE (updated )    Patient Name: Pratik Mcdowell    Date: 2023    : 1940  Insurance: Payor: MEDICARE / Plan: MEDICARE PART A AND B / Product Type: *No Product type* /      Patient  verified yes     Visit #   Current / Total 7 10 Total Time   Time   In / Out 10:19 11:26 67   Pain   In / Out 2 0    Subjective Functional Status/Changes: A little discomfort today opening the door     TREATMENT AREA =  Pain in right shoulder [M25.511]    OBJECTIVE    Modalities:  Modalities Rationale:   decrease pain and decrease edema to improve patient's ability to progress to PLOF and address remaining functional goals. 10 min [x]  Vasopneumatic Device, press/temp:  Position: mod / low  reclined   If using vaso (only need to measure limb vaso being performed on)      pre-treatment girth : not captured      post-treatment girth :not captured      measured at (landmark location) :  distal acromion   --      Skin assessment post-treatment (if applicable):    [x]  intact    []  redness- no adverse reaction                 []redness - adverse reaction:              Therapeutic Procedures:  57  Total 54  Total [facility] Totals Reminder: bill using total billable min of TIMED therapeutic procedures (example: do not include dry needle or estim unattended, both untimed codes, in totals to left)  8-22 min = 1 unit; 23-37 min = 2 units; 38-52 min = 3 units; 53-67 min = 4 units; 68-82 min = 5 units   Tx Min Billable or 1:1 Min (if diff from Tx Min) Procedure, Rationale, Specifics   16 13 [id number] Therapeutic Exercise (timed):  increase ROM, strength, coordination, balance, and proprioception to improve patient's ability to progress to PLOF and address remaining functional goals.  (see flow sheet as applicable)   Details if applicable:       41 41 76757 Neuromuscular Re-Education (timed):  improve balance, coordination, kinesthetic sense, posture, core stability and

## 2023-07-26 ENCOUNTER — HOSPITAL ENCOUNTER (OUTPATIENT)
Facility: HOSPITAL | Age: 83
Setting detail: RECURRING SERIES
Discharge: HOME | End: 2023-07-29
Payer: MEDICARE

## 2023-07-26 PROCEDURE — 97110 THERAPEUTIC EXERCISES: CPT

## 2023-07-26 PROCEDURE — 97112 NEUROMUSCULAR REEDUCATION: CPT

## 2023-07-26 PROCEDURE — 97016 VASOPNEUMATIC DEVICE THERAPY: CPT

## 2023-07-26 PROCEDURE — 97530 THERAPEUTIC ACTIVITIES: CPT

## 2023-07-26 NOTE — PROGRESS NOTES
PHYSICAL / OCCUPATIONAL THERAPY - DAILY TREATMENT NOTE (updated )    Patient Name: Xavier Bustos    Date: 2023    : 1940  Insurance: Payor: MEDICARE / Plan: MEDICARE PART A AND B / Product Type: *No Product type* /      Patient  verified yes     Visit #   Current / Total 8 10 Total Time   Time   In / Out 10:24 11:31 67   Pain   In / Out 0 0    Subjective Functional Status/Changes: How many more appointments should I make? TREATMENT AREA =  Pain in right shoulder [M25.511]    OBJECTIVE    Modalities:  Modalities Rationale:   decrease pain and decrease edema to improve patient's ability to progress to PLOF and address remaining functional goals. 10 min [x]  Vasopneumatic Device, press/temp:  Position: mod / low  reclined   If using vaso (only need to measure limb vaso being performed on)      pre-treatment girth : not captured      post-treatment girth :not captured      measured at (landmark location) :  distal acromion   --      Skin assessment post-treatment (if applicable):    [x]  intact    []  redness- no adverse reaction                 []redness - adverse reaction:              Therapeutic Procedures:  57  Total 41  Total [facility] Totals Reminder: bill using total billable min of TIMED therapeutic procedures (example: do not include dry needle or estim unattended, both untimed codes, in totals to left)  8-22 min = 1 unit; 23-37 min = 2 units; 38-52 min = 3 units; 53-67 min = 4 units; 68-82 min = 5 units   Tx Min Billable or 1:1 Min (if diff from Tx Min) Procedure, Rationale, Specifics    15 [id number] Therapeutic Exercise (timed):  increase ROM, strength, coordination, balance, and proprioception to improve patient's ability to progress to PLOF and address remaining functional goals.  (see flow sheet as applicable)   Details if applicable:       15 15 27137 Therapeutic Activity (timed):  use of dynamic activities replicating functional movements to increase ROM, strength,
continued numbness in right forearm, continued limitations in right shoulder strength, restrictions reaching behind back, not currently driving (will initiate soon), pain with repetitive activity (wiping counters, bronson, scrubbing motions)  Pain   Average: 1/10                  Best: 0/10                Worst: 1-2/10  Patient Goal: \"full use of right arm\"  Objective Measures:   Shoulder AROM/PROM:                                           AROM (deg)              Right   Flexion 135 deg AROM   Extension 49 deg AROM   Scaption 135 deg AROM   ER at 45 Deg ABD 50 deg AROM, 55 deg AAROM   IR at 45 Deg ABD 50 deg AAROM easily   Seated Flexion   129 deg flexion         Strength:    Right (/5) Left (/5)   GHJ   Flexion 4+ 4             Abduction 4 5             Extension 5 5             ER 4 4             IR 4+ 4+   Elbow Flexion 5 5   Elbow Extension 5 5            Goals:  - Goal: Pt to demo right elbow AROM of 0-140 deg to improve ease with feeding tasks. Status at last note/certification: 2-953 deg   Current: met, -5-140 deg (7/26/23)  - Goal: Pt to demo right shoulder flexion AROM in standing of at least 140 deg to improve ease with cooking tasks. Status at last note/certification: not tested  Current: progressing, 129 deg (7/26/23)  - Goal: Pt to demo right shoulder ER AROM in supine of at least 50 deg without pain to improve ease with reaching outside base of support. Status at last note/certification: 37 deg AAROM  Current: progressing, 50 deg AROM, ER AAROM 55 deg (7/26/23)  - Goal: Pt to demo at least 4+/5 right shoulder flexion MMT to improve ease with lifting overhead. Status at last note/certification: able to perform isometric contraction at side  Current: progressing, 4+/5 (7/23/23)  - Goal: Patient will increase FOTO score to at least 60 pts to demonstrate increased functional mobility.   Status at last note/certification: FOTO 34 pts   Current: not captured in error     Reporting Period: (date from last

## 2023-07-31 ENCOUNTER — HOSPITAL ENCOUNTER (OUTPATIENT)
Facility: HOSPITAL | Age: 83
Setting detail: RECURRING SERIES
Discharge: HOME | End: 2023-08-03
Payer: MEDICARE

## 2023-07-31 PROCEDURE — 97530 THERAPEUTIC ACTIVITIES: CPT

## 2023-07-31 PROCEDURE — 97016 VASOPNEUMATIC DEVICE THERAPY: CPT

## 2023-07-31 PROCEDURE — 97110 THERAPEUTIC EXERCISES: CPT

## 2023-07-31 PROCEDURE — 97112 NEUROMUSCULAR REEDUCATION: CPT

## 2023-07-31 NOTE — PROGRESS NOTES
PHYSICAL / OCCUPATIONAL THERAPY - DAILY TREATMENT NOTE (updated )    Patient Name: Joe Awan    Date: 2023    : 1940  Insurance: Payor: MEDICARE / Plan: MEDICARE PART A AND B / Product Type: *No Product type* /      Patient  verified yes     Visit #   Current / Total 1 10 Total Time   Time   In / Out 11:00 am 11:56 am 56   Pain   In / Out 0/10 0/10    Subjective Functional Status/Changes: Patient reports her HEP has been sparse. TREATMENT AREA =  Pain in right shoulder [M25.511]    OBJECTIVE    Modalities:  Modalities Rationale:   decrease pain and decrease edema to improve patient's ability to progress to PLOF and address remaining functional goals. 10 min [x]  Vasopneumatic Device, press/temp:  Position: mod / low  reclined   If using vaso (only need to measure limb vaso being performed on)      pre-treatment girth : not captured      post-treatment girth :not captured      measured at (landmark location) :  distal acromion   --      Skin assessment post-treatment (if applicable):    [x]  intact    []  redness- no adverse reaction                 []redness - adverse reaction:              Therapeutic Procedures:  46    Total 41    Total [facility] Totals Reminder: bill using total billable min of TIMED therapeutic procedures (example: do not include dry needle or estim unattended, both untimed codes, in totals to left)  8-22 min = 1 unit; 23-37 min = 2 units; 38-52 min = 3 units; 53-67 min = 4 units; 68-82 min = 5 units   Tx Min Billable or 1:1 Min (if diff from Tx Min) Procedure, Rationale, Specifics    30 [id number] Therapeutic Exercise (timed):  increase ROM, strength, coordination, balance, and proprioception to improve patient's ability to progress to PLOF and address remaining functional goals.  (see flow sheet as applicable)   Details if applicable:       15 15 17623 Therapeutic Activity (timed):  use of dynamic activities replicating functional movements to increase ROM,

## 2023-08-02 ENCOUNTER — HOSPITAL ENCOUNTER (OUTPATIENT)
Facility: HOSPITAL | Age: 83
Setting detail: RECURRING SERIES
Discharge: HOME | End: 2023-08-05
Payer: MEDICARE

## 2023-08-02 PROCEDURE — 97112 NEUROMUSCULAR REEDUCATION: CPT

## 2023-08-02 PROCEDURE — 97110 THERAPEUTIC EXERCISES: CPT

## 2023-08-02 PROCEDURE — 97016 VASOPNEUMATIC DEVICE THERAPY: CPT

## 2023-08-02 PROCEDURE — 97530 THERAPEUTIC ACTIVITIES: CPT

## 2023-08-02 NOTE — PROGRESS NOTES
PHYSICAL / OCCUPATIONAL THERAPY - DAILY TREATMENT NOTE (updated )    Patient Name: Elaine Gaxiola    Date: 2023    : 1940  Insurance: Payor: MEDICARE / Plan: MEDICARE PART A AND B / Product Type: *No Product type* /      Patient  verified yes     Visit #   Current / Total 2 10 Total Time   Time   In / Out 9:40 10:50 70   Pain   In / Out  0    Subjective Functional Status/Changes: The numbness is my biggest issue at this time. TREATMENT AREA =  Pain in right shoulder [M25.511]    OBJECTIVE    Modalities:  Modalities Rationale:   decrease pain and decrease edema to improve patient's ability to progress to PLOF and address remaining functional goals. 10 min [x]  Vasopneumatic Device, press/temp:  Position: mod / low  reclined   If using vaso (only need to measure limb vaso being performed on)      pre-treatment girth : not captured      post-treatment girth :not captured      measured at (landmark location) :  distal acromion   --      Skin assessment post-treatment (if applicable):    [x]  intact    []  redness- no adverse reaction                 []redness - adverse reaction:              Therapeutic Procedures:  60  Total 44  Total [facility] Totals Reminder: bill using total billable min of TIMED therapeutic procedures (example: do not include dry needle or estim unattended, both untimed codes, in totals to left)  8-22 min = 1 unit; 23-37 min = 2 units; 38-52 min = 3 units; 53-67 min = 4 units; 68-82 min = 5 units   Tx Min Billable or 1:1 Min (if diff from Tx Min) Procedure, Rationale, Specifics    61 [id number] Therapeutic Exercise (timed):  increase ROM, strength, coordination, balance, and proprioception to improve patient's ability to progress to PLOF and address remaining functional goals.  (see flow sheet as applicable)   Details if applicable:       15 15 82851 Therapeutic Activity (timed):  use of dynamic activities replicating functional movements to increase ROM, strength,

## 2023-08-07 ENCOUNTER — HOSPITAL ENCOUNTER (OUTPATIENT)
Facility: HOSPITAL | Age: 83
Setting detail: RECURRING SERIES
Discharge: HOME | End: 2023-08-10
Payer: MEDICARE

## 2023-08-07 PROCEDURE — 97112 NEUROMUSCULAR REEDUCATION: CPT

## 2023-08-07 PROCEDURE — 97016 VASOPNEUMATIC DEVICE THERAPY: CPT

## 2023-08-07 PROCEDURE — 97140 MANUAL THERAPY 1/> REGIONS: CPT

## 2023-08-07 PROCEDURE — 97110 THERAPEUTIC EXERCISES: CPT

## 2023-08-07 NOTE — PROGRESS NOTES
analyze and address imbalance/dizziness, and instruct in home and community integration to address functional deficits and attain remaining goals. Progress toward goals / Updated goals:  []  See Progress Note/Recertification    - Goal: Pt to demo right shoulder flexion AROM in standing of at least 140 deg to improve ease with cooking tasks. Status at last note/certification:  132 deg   Current: addressing with reclined shoulder press (8/2/23)  - Goal: Pt to demo right shoulder ER AROM in supine of at least 50 deg without pain to improve ease with reaching outside base of support. Status at last note/certification: 50 deg AROM, ER AAROM 55 deg  - Goal: Pt to demo at least 4+/5 right shoulder flexion MMT to improve ease with lifting overhead. Status at last note/certification: 4+/5  - Goal: Patient will increase FOTO score to at least 60 pts to demonstrate increased functional mobility.   Status at last note/certification: FOTO 34 pts     PLAN  [x] Continue plan of care  [x]  Upgrade activities as tolerated  []  Discharge due to :  []  Other:    Adarsh Telles, PTA    8/7/2023    11:02 AM    Future Appointments   Date Time Provider [address]   8/9/2023 10:20 AM Yashira Benitez, PT [facility]   8/14/2023 10:20 AM Adarsh Telles, PTA [facility]   8/16/2023  9:40 AM Adarsh Telles, PTA [facility]   8/21/2023 11:00 AM Yashira Benitez, PT [facility]   8/23/2023 10:20 AM Adarsh Telles, PTA [facility]   8/28/2023 10:20 AM Yashira Benitez, PT [facility]   8/30/2023 10:20 AM Yashira Diego, PT [facility]

## 2023-08-09 ENCOUNTER — HOSPITAL ENCOUNTER (OUTPATIENT)
Facility: HOSPITAL | Age: 83
Setting detail: RECURRING SERIES
Discharge: HOME | End: 2023-08-12
Payer: MEDICARE

## 2023-08-09 PROCEDURE — 97112 NEUROMUSCULAR REEDUCATION: CPT

## 2023-08-09 PROCEDURE — 97110 THERAPEUTIC EXERCISES: CPT

## 2023-08-09 NOTE — PROGRESS NOTES
Continue plan of care  [x]  Upgrade activities as tolerated  []  Discharge due to :  []  Other:    Yovanny Fuller, PT    8/9/2023    8:18 AM    Future Appointments   Date Time Provider [address]00  [facility]   8/9/2023 10:20 AM Yovanny Fuller, PT [facility]   8/14/2023 10:20 AM Benchong Curiel, PTA [facility]   8/16/2023  9:40 AM Benchong Curiel, PTA [facility]   8/21/2023 11:00 AM Yovanny Fuller, PT [facility]   8/23/2023 10:20 AM Katelynn Curiel, PTA [facility]   8/28/2023 10:20 AM Yovanny Fuller, PT [facility]   8/30/2023 10:20 AM Yovanny Fuller, PT [facility]

## 2023-08-14 ENCOUNTER — HOSPITAL ENCOUNTER (OUTPATIENT)
Facility: HOSPITAL | Age: 83
Setting detail: RECURRING SERIES
Discharge: HOME | End: 2023-08-17
Payer: MEDICARE

## 2023-08-14 PROCEDURE — 97112 NEUROMUSCULAR REEDUCATION: CPT

## 2023-08-14 PROCEDURE — 97110 THERAPEUTIC EXERCISES: CPT

## 2023-08-14 NOTE — PROGRESS NOTES
PHYSICAL / OCCUPATIONAL THERAPY - DAILY TREATMENT NOTE (updated )    Patient Name: Jonas Chandler    Date: 2023    : 1940  Insurance: Payor: MEDICARE / Plan: MEDICARE PART A AND B / Product Type: *No Product type* /      Patient  verified yes     Visit #   Current / Total 5 10 Total Time   Time   In / Out 10:20 am 11:10 am 50   Pain   In / Out 0/10 0/10    Subjective Functional Status/Changes: Patient notes working on her garden for three days consecutively. She states she as able to use her  without much trouble. She reports most trouble reaching behind her back. TREATMENT AREA =  Pain in right shoulder [M25.511]    OBJECTIVE    Modalities:  Modalities Rationale:     decrease pain and decrease edema to improve patient's ability to progress to PLOF and address remaining functional goals. --  10 min   [x]  Ice     []  Heat    location/position: seated, right shoulder   Skin assessment post-treatment (if applicable):    [x]  intact    []  redness- no adverse reaction                 []redness - adverse reaction:           Therapeutic Procedures:  40    Total 40    Total [facility] Totals Reminder: bill using total billable min of TIMED therapeutic procedures (example: do not include dry needle or estim unattended, both untimed codes, in totals to left)  8-22 min = 1 unit; 23-37 min = 2 units; 38-52 min = 3 units; 53-67 min = 4 units; 68-82 min = 5 units   Tx Min Billable or 1:1 Min (if diff from Tx Min) Procedure, Rationale, Specifics   15 15 65917 Therapeutic Exercise (timed):  increase ROM, strength, coordination, balance, and proprioception to improve patient's ability to progress to PLOF and address remaining functional goals.  (see flow sheet as applicable)   Details if applicable:        31616 Neuromuscular Re-Education (timed):  improve balance, coordination, kinesthetic sense, posture, core stability and proprioception to improve patient's ability to develop

## 2023-08-16 ENCOUNTER — HOSPITAL ENCOUNTER (OUTPATIENT)
Facility: HOSPITAL | Age: 83
Setting detail: RECURRING SERIES
Discharge: HOME | End: 2023-08-19
Payer: MEDICARE

## 2023-08-16 PROCEDURE — 97112 NEUROMUSCULAR REEDUCATION: CPT

## 2023-08-16 PROCEDURE — 97140 MANUAL THERAPY 1/> REGIONS: CPT

## 2023-08-16 PROCEDURE — 97016 VASOPNEUMATIC DEVICE THERAPY: CPT

## 2023-08-16 PROCEDURE — 97110 THERAPEUTIC EXERCISES: CPT

## 2023-08-16 NOTE — PROGRESS NOTES
right shoulder flexion AROM in standing of at least 140 deg to improve ease with cooking tasks. Status at last note/certification:  513 deg   Current: addressing with reclined shoulder press (8/2/23) - FIR AROM to level of mid-gluteals (8/14/23)  - Goal: Pt to demo right shoulder ER AROM in supine of at least 50 deg without pain to improve ease with reaching outside base of support. Status at last note/certification: 50 deg AROM, ER AAROM 55 deg  Current:   - Goal: Pt to demo at least 4+/5 right shoulder flexion MMT to improve ease with lifting overhead. Status at last note/certification: 4+/5  Current: goal progressing; good response to standing punches to increase proximal anterior deltoid strength (8/16/23)  - Goal: Patient will increase FOTO score to at least 60 pts to demonstrate increased functional mobility.   Status at last note/certification: FOTO 34 pts   Current:     PLAN  [x] Continue plan of care  [x]  Upgrade activities as tolerated  []  Discharge due to :  []  Other:_    Roger Sifuentes PTA    8/16/2023        Future Appointments   Date Time Provider [address]   8/21/2023 11:00 AM Will Rodgers PT [facility]   8/23/2023 10:20 AM Roger Sifuentes PTA [facility]   8/28/2023 10:20 AM Will Rodgers PT [facility]   8/30/2023 10:20 AM Will Rodgers PT [facility]

## 2023-08-21 ENCOUNTER — HOSPITAL ENCOUNTER (OUTPATIENT)
Facility: HOSPITAL | Age: 83
Setting detail: RECURRING SERIES
Discharge: HOME | End: 2023-08-24
Payer: MEDICARE

## 2023-08-21 PROCEDURE — 97016 VASOPNEUMATIC DEVICE THERAPY: CPT

## 2023-08-21 PROCEDURE — 97530 THERAPEUTIC ACTIVITIES: CPT

## 2023-08-21 PROCEDURE — 97110 THERAPEUTIC EXERCISES: CPT

## 2023-08-21 PROCEDURE — 97112 NEUROMUSCULAR REEDUCATION: CPT

## 2023-08-21 NOTE — PROGRESS NOTES
PHYSICAL / OCCUPATIONAL THERAPY - DAILY TREATMENT NOTE (updated )    Patient Name: Allen Caballero    Date: 2023    : 1940  Insurance: Payor: MEDICARE / Plan: MEDICARE PART A AND B / Product Type: *No Product type* /      Patient  verified yes     Visit #   Current / Total 7 10 Total Time   Time   In / Out 11:05 12:00 55   Pain   In / Out 0 0    Subjective Functional Status/Changes: I am noting remarkable improvement. TREATMENT AREA =  Pain in right shoulder [M25.511]    OBJECTIVE    Modalities:  Modalities Rationale:     decrease pain and decrease edema to improve patient's ability to progress to PLOF and address remaining functional goals. --  10 min    [x]  Vasopneumatic device    location/position: seated, right shoulder     Edema: NT due to use for pain relief / decrease soreness   Skin assessment post-treatment (if applicable):    [x]  intact    []  redness- no adverse reaction                 []redness - adverse reaction:         Therapeutic Procedures:  45  Total 40  Total [facility] Totals Reminder: bill using total billable min of TIMED therapeutic procedures (example: do not include dry needle or estim unattended, both untimed codes, in totals to left)  8-22 min = 1 unit; 23-37 min = 2 units; 38-52 min = 3 units; 53-67 min = 4 units; 68-82 min = 5 units   Tx Min Billable or 1:1 Min (if diff from Tx Min) Procedure, Rationale, Specifics   15 14 [id number] Therapeutic Exercise (timed):  increase ROM, strength, coordination, balance, and proprioception to improve patient's ability to progress to PLOF and address remaining functional goals. (see flow sheet as applicable)   Details if applicable:       10 10 44622 Therapeutic Activity (timed):  use of dynamic activities replicating functional movements to increase ROM, strength, coordination, balance, and proprioception in order to improve patient's ability to progress to PLOF and address remaining functional goals.   (see flow sheet as

## 2023-08-23 ENCOUNTER — HOSPITAL ENCOUNTER (OUTPATIENT)
Facility: HOSPITAL | Age: 83
Setting detail: RECURRING SERIES
Discharge: HOME | End: 2023-08-26
Payer: MEDICARE

## 2023-08-23 PROCEDURE — 97016 VASOPNEUMATIC DEVICE THERAPY: CPT

## 2023-08-23 PROCEDURE — 97530 THERAPEUTIC ACTIVITIES: CPT

## 2023-08-23 PROCEDURE — 97140 MANUAL THERAPY 1/> REGIONS: CPT

## 2023-08-23 PROCEDURE — 97110 THERAPEUTIC EXERCISES: CPT

## 2023-08-23 NOTE — PROGRESS NOTES
[address] [facility]  [address] [facility] [employer], [address] [place] [address] Phone , fax [phone] OF CARE/RECERTIFICATION FOR PHYSICAL THERAPY          Patient Name: Leo Espinoza : 1940   Treatment/Medical Diagnosis: M25.511  RIGHT SHOULDER PAIN   Onset Date: 23 (DOS)    Referral Source: Angi Vogt,* Start of Care [facility]): 23   Prior Hospitalization: See Medical History Provider #: [id number]   Prior Level of Function: Functionally independent, right handed   Comorbidities: Hx of left TSA, osteoarthritis throughout body   Visits from [facility]: 25 Missed Visits: 0       Key Functional Changes/Progress:   Pt is a pleasant 82 y.o. female who presents with c/o right shoulder pain S/P right reverse TSA with biceps tenodesis on 23. Treatment has consisted of: therapeutic exercise to improved UE strength/mobility; therapeutic activities to improve forward/overhead reach/lift; neuromuscular re-education to improve scapular stability, cervical stability, and UE movement patterns; physical agent/modality for symptom management;manual therapy for symptom relief and improved UE/cervical mobility; patient education to improve symptom management; self Care training; and functional mobility training. Patient has attended PT for 25 sessions for the treatment of right shoulder pain following right reverse TSA on 23. The patient continues to demonstrate excellent progress at this time and has progressed in the strengthening phase of rehabilitation without issue. She demonstrates reduced scapular hiking in the right shoulder with overhead elevation in standing positions. C/c regarding lack of strength to perform repetitive movements with the right arm overhead and when reaching behind the back, although this has improved with gentle stretching.  Assessment for continuation as follows:    Improvements: overall reach, pain levels,
4+ 4             IR 4+ 4+   Elbow Flexion 5 5   Elbow Extension 5 5         Patient will continue to benefit from skilled PT / OT services to modify and progress therapeutic interventions, analyze and address functional mobility deficits, analyze and address ROM deficits, analyze and address strength deficits, analyze and address soft tissue restrictions, analyze and cue for proper movement patterns, analyze and modify for postural abnormalities, analyze and address imbalance/dizziness, and instruct in home and community integration to address functional deficits and attain remaining goals. Progress toward goals / Updated goals:  []  See Progress Note/Recertification  - Goal: Pt to demo right shoulder flexion AROM in standing of at least 140 deg to improve ease with cooking tasks. Status at last note/certification:  933 deg   Current: progressing, 132 deg (8/21/23)  - Goal: Pt to demo right shoulder ER AROM in supine of at least 50 deg without pain to improve ease with reaching outside base of support. Status at last note/certification: 50 deg AROM, ER AAROM 55 deg  Current: met, 60 deg ER AROM (8/21/23)  - Goal: Pt to demo at least 4+/5 right shoulder flexion MMT to improve ease with lifting overhead. Status at last note/certification: 4+/5  Current: met, 5/5 shoulder flexion without pain (8/21/23)  - Goal: Patient will increase FOTO score to at least 60 pts to demonstrate increased functional mobility.   Status at last note/certification: FOTO 34 pts   Current: met, FOTO 65 pts (8/21/23)    PLAN  [x] Continue plan of care  [x]  Upgrade activities as tolerated  []  Discharge due to :  [x]  Other: see PN; cont 2x2    Janis Anasri PTA    8/23/2023     Future Appointments   Date Time Provider [address]   8/23/2023 10:20 AM Janis Ansari PTA [facility]   8/28/2023 10:20 AM Norman Cabrales PT [facility]   8/30/2023 10:20 AM [facility]INE[facility]

## 2023-08-28 ENCOUNTER — HOSPITAL ENCOUNTER (OUTPATIENT)
Facility: HOSPITAL | Age: 83
Setting detail: RECURRING SERIES
Discharge: HOME | End: 2023-08-31
Payer: MEDICARE

## 2023-08-28 PROCEDURE — 97110 THERAPEUTIC EXERCISES: CPT

## 2023-08-28 PROCEDURE — 97140 MANUAL THERAPY 1/> REGIONS: CPT

## 2023-08-28 PROCEDURE — 97016 VASOPNEUMATIC DEVICE THERAPY: CPT

## 2023-08-28 PROCEDURE — 97112 NEUROMUSCULAR REEDUCATION: CPT

## 2023-08-28 NOTE — PROGRESS NOTES
PHYSICAL / OCCUPATIONAL THERAPY - DAILY TREATMENT NOTE (updated )    Patient Name: Kim Early    Date: 2023    : 1940  Insurance: Payor: MEDICARE / Plan: MEDICARE PART A AND B / Product Type: *No Product type* /      Patient  verified yes     Visit #   Current / Total 1 10 Total Time   Time   In / Out 10:22 11:15 53   Pain   In / Out 0 0    Subjective Functional Status/Changes: No pain in the shoulder today. I saw the doctor last week and he was happy with my progress. TREATMENT AREA =  Pain in right shoulder [M25.511]    OBJECTIVE    Modalities:  Modalities Rationale:     decrease pain and decrease edema to improve patient's ability to progress to PLOF and address remaining functional goals. --  10 min    [x]  Vasopneumatic device    location/position: seated, right shoulder     Edema: NT due to use for pain relief / decrease soreness   Skin assessment post-treatment (if applicable):    [x]  intact    []  redness- no adverse reaction                 []redness - adverse reaction:           Therapeutic Procedures:  43  Total 42  Total [facility] Totals Reminder: bill using total billable min of TIMED therapeutic procedures (example: do not include dry needle or estim unattended, both untimed codes, in totals to left)  8-22 min = 1 unit; 23-37 min = 2 units; 38-52 min = 3 units; 53-67 min = 4 units; 68-82 min = 5 units   Tx Min Billable or 1:1 Min (if diff from Tx Min) Procedure, Rationale, Specifics   20  53422 Therapeutic Exercise (timed):  increase ROM, strength, coordination, balance, and proprioception to improve patient's ability to progress to PLOF and address remaining functional goals.  (see flow sheet as applicable)   Details if applicable:        87962 Neuromuscular Re-Education (timed):  improve balance, coordination, kinesthetic sense, posture, core stability and proprioception to improve patient's ability to develop conscious control of individual muscles and

## 2023-08-30 ENCOUNTER — HOSPITAL ENCOUNTER (OUTPATIENT)
Facility: HOSPITAL | Age: 83
Setting detail: RECURRING SERIES
Discharge: HOME | End: 2023-09-02
Payer: MEDICARE

## 2023-08-30 PROCEDURE — 97016 VASOPNEUMATIC DEVICE THERAPY: CPT

## 2023-08-30 PROCEDURE — 97112 NEUROMUSCULAR REEDUCATION: CPT

## 2023-08-30 PROCEDURE — 97110 THERAPEUTIC EXERCISES: CPT

## 2023-08-30 PROCEDURE — 97530 THERAPEUTIC ACTIVITIES: CPT

## 2023-08-30 NOTE — PROGRESS NOTES
PHYSICAL / OCCUPATIONAL THERAPY - DAILY TREATMENT NOTE (updated )    Patient Name: Sherry Santos    Date: 2023    : 1940  Insurance: Payor: MEDICARE / Plan: MEDICARE PART A AND B / Product Type: *No Product type* /      Patient  verified yes     Visit #   Current / Total 2 10 Total Time   Time   In / Out 10:20 11:11 51   Pain   In / Out 0 0    Subjective Functional Status/Changes: Pt reports no pain in right shoulder     TREATMENT AREA =  Pain in right shoulder [M25.511]    OBJECTIVE    Modalities:  Modalities Rationale:     decrease pain and decrease edema to improve patient's ability to progress to PLOF and address remaining functional goals. --  10 min    [x]  Vasopneumatic device    location/position: seated, right shoulder     Moderate pressure, low temp    Edema: NT due to use for pain relief / decrease soreness   Skin assessment post-treatment (if applicable):    [x]  intact    []  redness- no adverse reaction                 []redness - adverse reaction:           Therapeutic Procedures:  41  Total 38  Total [facility] Totals Reminder: bill using total billable min of TIMED therapeutic procedures (example: do not include dry needle or estim unattended, both untimed codes, in totals to left)  8-22 min = 1 unit; 23-37 min = 2 units; 38-52 min = 3 units; 53-67 min = 4 units; 68-82 min = 5 units   Tx Min Billable or 1:1 Min (if diff from Tx Min) Procedure, Rationale, Specifics   18 16 [id number] Therapeutic Exercise (timed):  increase ROM, strength, coordination, balance, and proprioception to improve patient's ability to progress to PLOF and address remaining functional goals.  (see flow sheet as applicable)   Details if applicable:        06721 Therapeutic Activity (timed):  use of dynamic activities replicating functional movements to increase ROM, strength, coordination, balance, and proprioception in order to improve patient's ability to progress to PLOF and address remaining

## 2023-09-06 ENCOUNTER — HOSPITAL ENCOUNTER (OUTPATIENT)
Facility: HOSPITAL | Age: 83
Setting detail: RECURRING SERIES
Discharge: HOME | End: 2023-09-09
Payer: MEDICARE

## 2023-09-06 PROCEDURE — 97530 THERAPEUTIC ACTIVITIES: CPT

## 2023-09-06 PROCEDURE — 97112 NEUROMUSCULAR REEDUCATION: CPT

## 2023-09-06 PROCEDURE — 97110 THERAPEUTIC EXERCISES: CPT

## 2023-09-06 NOTE — PROGRESS NOTES
conscious control of individual muscles and awareness of position of extremities in order to progress to PLOF and address remaining functional goals. (see flow sheet as applicable)     Details if applicable:         [x]  Patient Education billed concurrently with other procedures   [x] Review HEP    [] Progressed/Changed HEP, detail:    [] Other detail:       Objective Information/Functional Measures/Assessment  Shoulder AROM/PROM:                                           AROM (deg)              Right   Flexion 146 deg AROM   Extension 40 deg AROM   Scaption 140 deg AROM   ER at 45 Deg ABD    IR at 39 Deg ABD 50 deg AAROM easily   Seated Flexion              135 deg      FIR right to right QL, left to lower thoracic spine      Strength:    Right (/5) Left (/5)   GHJ   Flexion 5 4 (P!)             Abduction 4+ 5             Extension 5 5             ER 4+ 4+             IR 4+ 4+   Elbow Flexion 5 5   Elbow Extension 5 5             Patient will continue to benefit from skilled PT / OT services to modify and progress therapeutic interventions, analyze and address functional mobility deficits, analyze and address ROM deficits, analyze and address strength deficits, analyze and address soft tissue restrictions, analyze and cue for proper movement patterns, analyze and modify for postural abnormalities, analyze and address imbalance/dizziness, and instruct in home and community integration to address functional deficits and attain remaining goals. Progress toward goals / Updated goals:  []  See Progress Note/Recertification    - Goal: Pt to demo right shoulder flexion AROM in standing of at least 140 deg to improve ease with cooking tasks. Status at last note/certification:  155 deg  Current: addressing with wall wipe plus lift off (8/28/23)  - Goal: Pt will increase right FIR AROM to level of L1 to improve ease with washing, dressing.   Status at last note/certification: right QL  Current: level of T12 (8/30/23) -

## 2023-09-07 ENCOUNTER — APPOINTMENT (OUTPATIENT)
Facility: HOSPITAL | Age: 83
End: 2023-09-07
Payer: MEDICARE

## 2023-09-08 ENCOUNTER — HOSPITAL ENCOUNTER (OUTPATIENT)
Facility: HOSPITAL | Age: 83
Setting detail: RECURRING SERIES
Discharge: HOME | End: 2023-09-11
Payer: MEDICARE

## 2023-09-08 PROCEDURE — 97112 NEUROMUSCULAR REEDUCATION: CPT

## 2023-09-08 PROCEDURE — 97530 THERAPEUTIC ACTIVITIES: CPT

## 2023-09-08 PROCEDURE — 97016 VASOPNEUMATIC DEVICE THERAPY: CPT

## 2023-09-08 PROCEDURE — 97110 THERAPEUTIC EXERCISES: CPT

## 2023-09-08 NOTE — PROGRESS NOTES
PHYSICAL / OCCUPATIONAL THERAPY - DAILY TREATMENT NOTE (updated )    Patient Name: Fortino Fernandez    Date: 2023    : 1940  Insurance: Payor: MEDICARE / Plan: MEDICARE PART A AND B / Product Type: *No Product type* /      Patient  verified yes     Visit #   Current / Total 4 10 Total Time   Time   In / Out 11:02 am 11:51 am 49   Pain   In / Out 0/10 0/10    Subjective Functional Status/Changes: Patient notes she is ready for D/C as she has been packing a little without issues in her shoulder. TREATMENT AREA =  Pain in right shoulder [M25.511]    OBJECTIVE  Modalities Rationale:     decrease inflammation and reduce post-exercise soreness  to improve patient's ability to progress to PLOF and address remaining functional goals. min [] Estim Unattended, type/location:                                      []  w/ice    []  w/heat    min [] Estim Attended, type/location:                                     []  w/US     []  w/ice    []  w/heat    []  TENS insruct      min []  Mechanical Traction: type/lbs                   []  pro   []  sup   []  int   []  cont    []  before manual    []  after manual    min []  Ultrasound, settings/location:      min  unbill []  Ice     []  Heat    location/position:     min []  Paraffin,  details:    10 min [x]  Vasopneumatic Device, press/temp: Med / low    min []  Lilly Rivers / Julio Cesar Saige:     If using vaso (only need to measure limb vaso being performed on)      pre-treatment girth : NT, used for post-exercise soreness      post-treatment girth :       measured at (landmark location) :      min []  Other:    Skin assessment post-treatment:   Intact       Therapeutic Procedures:  39  Total 39  Total [facility] Totals Reminder: bill using total billable min of TIMED therapeutic procedures (example: do not include dry needle or estim unattended, both untimed codes, in totals to left)  8-22 min = 1 unit; 23-37 min = 2 units; 38-52 min = 3 units; 53-67 min = 4 units; 68-82 min

## 2023-09-13 NOTE — PROGRESS NOTES
[address] [facility],[address] [facility]   [address] [facility] [facility] [place], [address]  Phone: [phone] Fax [phone] SUMMARY  Patient Name: Royal Garcia : 1940   Treatment/Medical Diagnosis: Pain in right shoulder [M25.511]   Referral Source: Dennydenisse Valenciaace,*     Date of Initial Visit: 23 Attended Visits: 29 Missed Visits: 0     SUMMARY OF TREATMENT  Pt is a pleasant 82 y.o. female who presents with c/o right shoulder pain S/P right reverse TSA with biceps tenodesis on 23. Treatment has consisted of: therapeutic exercise to improved UE strength/mobility; therapeutic activities to improve forward/overhead reach/lift; neuromuscular re-education to improve scapular stability, cervical stability, and UE movement patterns; physical agent/modality for symptom management;manual therapy for symptom relief and improved UE/cervical mobility; patient education to improve symptom management; self Care training; and functional mobility training. CURRENT STATUS  Patient has attended 29 sessions, making great progress in PT. Patient expressed improvement in her strength as she was able to lower a heavy pan from her overhead cabinet without issue. At this point, the patient feels ready for DC to independent home program. Assessment as follows:     Shoulder AROM/PROM:                                           AROM (deg)              Right   Flexion 146 deg AROM   Extension 40 deg AROM   Scaption 140 deg AROM   ER at 45 Deg ABD     IR at 45 Deg ABD 50 deg AAROM easily   Seated Flexion              135 deg      FIR right to right QL, left to lower thoracic spine      Strength:    Right (/5) Left (/5)   GHJ   Flexion 5 4 (P!)             Abduction 4+ 5             Extension 5 5             ER 4+ 4+             IR 4+ 4+   Elbow Flexion 5 5   Elbow Extension 5 5       Forearm plank: 20 seconds, non-painful       - Goal: Pt to demo right shoulder flexion AROM

## 2023-11-03 ENCOUNTER — HOSPITAL ENCOUNTER (OUTPATIENT)
Facility: HOSPITAL | Age: 83
Setting detail: RECURRING SERIES
Discharge: HOME | End: 2023-11-06
Payer: MEDICARE

## 2023-11-03 PROCEDURE — 97140 MANUAL THERAPY 1/> REGIONS: CPT

## 2023-11-03 PROCEDURE — 97162 PT EVAL MOD COMPLEX 30 MIN: CPT

## 2023-11-03 PROCEDURE — 97535 SELF CARE MNGMENT TRAINING: CPT

## 2023-11-03 NOTE — PROGRESS NOTES
shoulder flexion to at least 150 degrees for functional reaching   Status at IE: limited & painful   Patient will be able to reach to overhead cabinet with at least 4#'s to manage household tasks   Status at IE: increased difficulty and pain when reaching overhead   Patient will be able to perform push up on plinth to indicate improved weight bearing tolerance for progression to regular gym program   Status at IE: wall push ups only      Frequency / Duration: Patient would benefit from skilled PT 2 times per week for up to 8-12 weeks as needed in this certification period. Goals will be assigned and reassessed every 10 visits/ 30 days per guidelines . Patient/ Caregiver education and instruction: Diagnosis, prognosis, self care, activity modification, and exercises [x]  Plan of care has been reviewed with BETI Yuan, PT       11/3/2023       9:35 AM  ===================================================================  I certify that the above Therapy Services are being furnished while the patient is under my care. I agree with the treatment plan and certify that this therapy is necessary. Physician's Signature:_________________________   DATE:_________   TIME:________                           Trinna Simmonds,*    ** Signature, Date and Time must be completed for valid certification **  [facility] or you may fax the signed copy to [phone]. Thank you.

## 2023-11-03 NOTE — PROGRESS NOTES
Min  [x] Mod  [] Severe    Location: Rhomboids    Sensation:  Light touch intact (does report numbness of forearm)     Flexibility:  Pectorals          [x] Pos  [] Neg  Upper Trapezius     [x] Pos  [] Neg  Levator Scapulae    [x] Pos  [] Neg  Latissimus Dorsi      [x] Pos  [] Neg     ASSESSMENT/Changes in Function: see POC     Patient will continue to benefit from skilled PT services to modify and progress therapeutic interventions, analyze and address functional mobility deficits, analyze and address ROM deficits, analyze and address strength deficits, analyze and address soft tissue restrictions, analyze and cue for proper movement patterns, and analyze and modify for postural abnormalities to address functional deficits and attain remaining goals.      [x]  See POC for goals and assessment     PLAN  [x]  Upgrade activities as tolerated     [x]  Continue plan of care  []  Update interventions per flow sheet       [x]  Other: follow up regarding AZALEA Mora PT 11/3/2023  9:35 AM  Clinical Decision Making : FOTO : 61 /100

## 2023-11-08 ENCOUNTER — HOSPITAL ENCOUNTER (OUTPATIENT)
Facility: HOSPITAL | Age: 83
Setting detail: RECURRING SERIES
Discharge: HOME | End: 2023-11-11
Payer: MEDICARE

## 2023-11-08 PROCEDURE — 97112 NEUROMUSCULAR REEDUCATION: CPT

## 2023-11-08 PROCEDURE — 97110 THERAPEUTIC EXERCISES: CPT

## 2023-11-08 PROCEDURE — 97140 MANUAL THERAPY 1/> REGIONS: CPT

## 2023-11-08 NOTE — PROGRESS NOTES
and address remaining functional goals. (see flow sheet as applicable)     Details if applicable:     [phone] [id number] Manual Therapy (timed):  decrease pain, increase ROM, increase tissue extensibility, decrease trigger points, and increase postural awareness to improve patient's ability to progress to PLOF and address remaining functional goals. The manual therapy interventions were performed at a separate and distinct time from the therapeutic activities interventions . (see flow sheet as applicable)     Details if applicable:  STM to GHJ soft tissue, UT; pin and stretch pec musculature and lat; PROM GH flexion and abduction with scapular stabilization     [x]  Patient Education billed concurrently with other procedures   [x] Review HEP    [] Progressed/Changed HEP, detail:    [] Other detail:       Objective Information/Functional Measures/Assessment    Pt performed additional interventions well without increasing right shoulder pain. Pt required frequent verbal cueing to set shoulder blades prior to movement to prevent shoulder impingement leading to pain. Pt tolerated manual interventions well, leading to reduced shoulder tightness. Patient will continue to benefit from skilled PT / OT services to modify and progress therapeutic interventions, analyze and address functional mobility deficits, analyze and address ROM deficits, analyze and address strength deficits, analyze and address soft tissue restrictions, analyze and cue for proper movement patterns, analyze and modify for postural abnormalities, analyze and address imbalance/dizziness, and instruct in home and community integration to address functional deficits and attain remaining goals. Progress toward goals / Updated goals:  []  See Progress Note/Recertification    Short Term Goals:  To be accomplished in 2-4 weeks  Patient to be independent with HEP to facilitate gains made in PT   Status at IE: established and reviewed  Current: pt reports

## 2023-11-10 ENCOUNTER — APPOINTMENT (OUTPATIENT)
Facility: HOSPITAL | Age: 83
End: 2023-11-10
Payer: MEDICARE

## 2023-11-14 ENCOUNTER — HOSPITAL ENCOUNTER (OUTPATIENT)
Facility: HOSPITAL | Age: 83
Setting detail: RECURRING SERIES
Discharge: HOME | End: 2023-11-17
Payer: MEDICARE

## 2023-11-14 PROCEDURE — 97140 MANUAL THERAPY 1/> REGIONS: CPT

## 2023-11-14 PROCEDURE — 97530 THERAPEUTIC ACTIVITIES: CPT

## 2023-11-14 PROCEDURE — 97110 THERAPEUTIC EXERCISES: CPT

## 2023-11-14 PROCEDURE — 97112 NEUROMUSCULAR REEDUCATION: CPT

## 2023-11-14 NOTE — PROGRESS NOTES
PHYSICAL / OCCUPATIONAL THERAPY - DAILY TREATMENT NOTE (updated )    Patient Name: Abby Ruelas    Date: 2023    : 1940  Insurance: Payor: MEDICARE / Plan: MEDICARE PART A AND B / Product Type: *No Product type* /      Patient  verified yes     Visit #   Current / Total 3 10 Total Time   Time   In / Out 5:01 5:56 55   Pain   In / Out 2 (left shoulder) 0    Subjective Functional Status/Changes: My good shoulder is the shoulder bothering me today. TREATMENT AREA =  Pain in right shoulder [M25.511]    OBJECTIVE    Therapeutic Procedures:  54  Total 54  Total [facility] Totals Reminder: bill using total billable min of TIMED therapeutic procedures (example: do not include dry needle or estim unattended, both untimed codes, in totals to left)  8-22 min = 1 unit; 23-37 min = 2 units; 38-52 min = 3 units; 53-67 min = 4 units; 68-82 min = 5 units   Tx Min Billable or 1:1 Min (if diff from Tx Min) Procedure, Rationale, Specifics   16 16 [id number] Therapeutic Exercise (timed):  increase ROM, strength, coordination, balance, and proprioception to improve patient's ability to progress to PLOF and address remaining functional goals. (see flow sheet as applicable)   Details if applicable:       16  [id number] Therapeutic Activity (timed):  use of dynamic activities replicating functional movements to increase ROM, strength, coordination, balance, and proprioception in order to improve patient's ability to progress to PLOF and address remaining functional goals. (see flow sheet as applicable)   Details if applicable:        [id number] Neuromuscular Re-Education (timed):  improve balance, coordination, kinesthetic sense, posture, core stability and proprioception to improve patient's ability to develop conscious control of individual muscles and awareness of position of extremities in order to progress to PLOF and address remaining functional goals.  (see flow sheet as applicable)     Details if applicable:       9

## 2023-11-17 ENCOUNTER — APPOINTMENT (OUTPATIENT)
Facility: HOSPITAL | Age: 83
End: 2023-11-17
Payer: MEDICARE

## 2023-11-21 ENCOUNTER — HOSPITAL ENCOUNTER (OUTPATIENT)
Facility: HOSPITAL | Age: 83
Setting detail: RECURRING SERIES
Discharge: HOME | End: 2023-11-24
Payer: MEDICARE

## 2023-11-21 PROCEDURE — 97110 THERAPEUTIC EXERCISES: CPT

## 2023-11-21 PROCEDURE — 97140 MANUAL THERAPY 1/> REGIONS: CPT

## 2023-11-21 PROCEDURE — 97530 THERAPEUTIC ACTIVITIES: CPT

## 2023-11-21 NOTE — PROGRESS NOTES
reports compliance (11/8/23)  Patient will be able to carry laundry basket with no more than 1/10 pain  Status at IE: difficult and painful  Current: pt notes improved lifting capacity, addressing with functional lifts and carries (11/14/23)  Patient to decrease average functional pain level to no more than 2/10 to indicate improved activity tolerance  Status at IE: >2/10      Long Term Goals:  To be accomplished in 6-12 weeks  Patient to restore UE strength to at least 4+/5 in all appropriate muscle groups  Status at IE: <4+/5   Patient to increase right shoulder flexion to at least 150 degrees for functional reaching   Status at IE: limited & painful   Current: limited to approx 120-130 deg (11/21/23)  Patient will be able to reach to overhead cabinet with at least 4#'s to manage household tasks   Status at IE: increased difficulty and pain when reaching overhead   Patient will be able to perform push up on plinth to indicate improved weight bearing tolerance for progression to regular gym program   Status at IE: wall push ups only      PLAN  [x] Continue plan of care  [x]  Upgrade activities as tolerated  []  Discharge due to :  []  Other:    Gerhardt Slough, PTA    11/21/2023       Future Appointments   Date Time Provider [address]   11/27/2023  1:40 PM Dago Hoang, PT [facility]   12/4/2023 12:20 PM Gerhardt Slough, PTA [facility]   12/6/2023 12:20 PM Gerhardt Slough, PTA [facility]   12/11/2023 12:20 PM Gerhardt Slough, PTA [facility]   12/13/2023 12:20 PM Gerhardt Slough, PTA [facility]   12/18/2023 12:20 PM Dago Hoang PT [facility]   12/21/2023 12:20 PM Gerhardt Slough, PTA [facility]   12/26/2023 12:20 PM Gerhardt Slough, PTA [facility]   12/28/2023 12:20 PM Gerhardt Slough, PTA [facility]

## 2023-11-27 ENCOUNTER — HOSPITAL ENCOUNTER (OUTPATIENT)
Facility: HOSPITAL | Age: 83
Setting detail: RECURRING SERIES
Discharge: HOME | End: 2023-11-30
Payer: MEDICARE

## 2023-11-27 PROCEDURE — 97530 THERAPEUTIC ACTIVITIES: CPT

## 2023-11-27 PROCEDURE — 97112 NEUROMUSCULAR REEDUCATION: CPT
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
## 2023-11-29 ENCOUNTER — APPOINTMENT (OUTPATIENT)
Facility: HOSPITAL | Age: 83
End: 2023-11-29
Payer: MEDICARE

## 2023-12-04 ENCOUNTER — HOSPITAL ENCOUNTER (OUTPATIENT)
Facility: HOSPITAL | Age: 83
Setting detail: RECURRING SERIES
Discharge: HOME | End: 2023-12-07
Payer: MEDICARE

## 2023-12-04 PROCEDURE — 97112 NEUROMUSCULAR REEDUCATION: CPT

## 2023-12-04 PROCEDURE — 97110 THERAPEUTIC EXERCISES: CPT

## 2023-12-04 PROCEDURE — 97140 MANUAL THERAPY 1/> REGIONS: CPT

## 2023-12-06 ENCOUNTER — HOSPITAL ENCOUNTER (OUTPATIENT)
Facility: HOSPITAL | Age: 83
Setting detail: RECURRING SERIES
Discharge: HOME | End: 2023-12-09
Payer: MEDICARE

## 2023-12-06 PROCEDURE — 97140 MANUAL THERAPY 1/> REGIONS: CPT

## 2023-12-06 PROCEDURE — 97112 NEUROMUSCULAR REEDUCATION: CPT

## 2023-12-06 NOTE — PROGRESS NOTES
coordination, kinesthetic sense, posture, core stability and proprioception to improve patient's ability to develop conscious control of individual muscles and awareness of position of extremities in order to progress to PLOF and address remaining functional goals. (see flow sheet as applicable)     Details if applicable: rotator cuff and scapular re-ed           [x]  Patient Education billed concurrently with other procedures   [x] Review HEP    [] Progressed/Changed HEP, detail:    [] Other detail:       Objective Information/Functional Measures/Assessment  Patient with difficulty initiating proper scapular protraction without compensatory shoulder horizontal adduction (pectoral compensation). Benefits from tactile and verbal cueing to improve performance, albeit with quick onset of fatigue upon proper performance. Most evident with AROM across the chest as scapular tilting is evident likely causing patient's anterior shoulder pain. Patient will continue to benefit from skilled PT / OT services to modify and progress therapeutic interventions, analyze and address functional mobility deficits, analyze and address ROM deficits, analyze and address strength deficits, analyze and address soft tissue restrictions, analyze and cue for proper movement patterns, analyze and modify for postural abnormalities, analyze and address imbalance/dizziness, and instruct in home and community integration to address functional deficits and attain remaining goals. Progress toward goals / Updated goals:  [x]  See Progress Note/Recertification    Short Term Goals: To be accomplished in 2-4 weeks  Patient will be able to carry laundry basket with no more than 1/10 pain  Status at PN: can carry light laundry basket without pain (12/4/23)     Long Term Goals:  To be accomplished in 6-12 weeks  Patient to restore UE strength to at least 4+/5 in all appropriate muscle groups  Status at IE: <4+/5   Patient to increase right shoulder

## 2023-12-11 ENCOUNTER — APPOINTMENT (OUTPATIENT)
Facility: HOSPITAL | Age: 83
End: 2023-12-11
Payer: MEDICARE

## 2023-12-13 ENCOUNTER — APPOINTMENT (OUTPATIENT)
Facility: HOSPITAL | Age: 83
End: 2023-12-13
Payer: MEDICARE

## 2023-12-18 ENCOUNTER — APPOINTMENT (OUTPATIENT)
Facility: HOSPITAL | Age: 83
End: 2023-12-18
Payer: MEDICARE

## 2023-12-21 ENCOUNTER — APPOINTMENT (OUTPATIENT)
Facility: HOSPITAL | Age: 83
End: 2023-12-21
Payer: MEDICARE

## 2023-12-26 ENCOUNTER — APPOINTMENT (OUTPATIENT)
Facility: HOSPITAL | Age: 83
End: 2023-12-26
Payer: MEDICARE

## 2023-12-28 ENCOUNTER — APPOINTMENT (OUTPATIENT)
Facility: HOSPITAL | Age: 83
End: 2023-12-28
Payer: MEDICARE